# Patient Record
Sex: MALE | Race: WHITE | NOT HISPANIC OR LATINO | URBAN - METROPOLITAN AREA
[De-identification: names, ages, dates, MRNs, and addresses within clinical notes are randomized per-mention and may not be internally consistent; named-entity substitution may affect disease eponyms.]

---

## 2018-10-01 ENCOUNTER — TELEPHONE (OUTPATIENT)
Dept: UROLOGY | Facility: CLINIC | Age: 53
End: 2018-10-01

## 2018-10-01 NOTE — TELEPHONE ENCOUNTER
Yolanda Doe patient, last seen in follow up in 2016,  Spoke to patient , he called for refill of Trimix,  Advised patient due to length of time since his las follow up he will need an appointment  Follow up scheduled for 11/8/18 at 2:30 pm Robert with [facility],  Provided patient with office address

## 2018-11-05 ENCOUNTER — OFFICE VISIT (OUTPATIENT)
Dept: FAMILY MEDICINE CLINIC | Facility: CLINIC | Age: 53
End: 2018-11-05
Payer: COMMERCIAL

## 2018-11-05 VITALS
HEIGHT: 74 IN | WEIGHT: 260 LBS | SYSTOLIC BLOOD PRESSURE: 158 MMHG | BODY MASS INDEX: 33.37 KG/M2 | HEART RATE: 60 BPM | RESPIRATION RATE: 18 BRPM | DIASTOLIC BLOOD PRESSURE: 100 MMHG

## 2018-11-05 DIAGNOSIS — M25.561 CHRONIC PAIN OF BOTH KNEES: ICD-10-CM

## 2018-11-05 DIAGNOSIS — Z86.39 HISTORY OF HYPERGLYCEMIA: ICD-10-CM

## 2018-11-05 DIAGNOSIS — S03.40XA SPRAIN OF TEMPOROMANDIBULAR JOINT, INITIAL ENCOUNTER: ICD-10-CM

## 2018-11-05 DIAGNOSIS — R03.0 ELEVATED BLOOD PRESSURE READING WITHOUT DIAGNOSIS OF HYPERTENSION: Primary | ICD-10-CM

## 2018-11-05 DIAGNOSIS — G89.29 CHRONIC PAIN OF BOTH KNEES: ICD-10-CM

## 2018-11-05 DIAGNOSIS — Z13.220 LIPID SCREENING: ICD-10-CM

## 2018-11-05 DIAGNOSIS — I10 ESSENTIAL HYPERTENSION: ICD-10-CM

## 2018-11-05 DIAGNOSIS — K43.9 VENTRAL HERNIA WITHOUT OBSTRUCTION OR GANGRENE: ICD-10-CM

## 2018-11-05 DIAGNOSIS — G51.4 FACIAL TWITCHING: ICD-10-CM

## 2018-11-05 DIAGNOSIS — E66.09 CLASS 1 OBESITY DUE TO EXCESS CALORIES WITHOUT SERIOUS COMORBIDITY WITH BODY MASS INDEX (BMI) OF 33.0 TO 33.9 IN ADULT: ICD-10-CM

## 2018-11-05 DIAGNOSIS — Z23 NEED FOR TDAP VACCINATION: ICD-10-CM

## 2018-11-05 DIAGNOSIS — M25.562 CHRONIC PAIN OF BOTH KNEES: ICD-10-CM

## 2018-11-05 DIAGNOSIS — Z23 NEED FOR INFLUENZA VACCINATION: ICD-10-CM

## 2018-11-05 DIAGNOSIS — Z12.5 PROSTATE CANCER SCREENING: ICD-10-CM

## 2018-11-05 DIAGNOSIS — Z76.89 ENCOUNTER TO ESTABLISH CARE: ICD-10-CM

## 2018-11-05 PROBLEM — E66.811 CLASS 1 OBESITY DUE TO EXCESS CALORIES WITHOUT SERIOUS COMORBIDITY WITH BODY MASS INDEX (BMI) OF 33.0 TO 33.9 IN ADULT: Status: ACTIVE | Noted: 2018-11-05

## 2018-11-05 PROCEDURE — 90471 IMMUNIZATION ADMIN: CPT

## 2018-11-05 PROCEDURE — 99204 OFFICE O/P NEW MOD 45 MIN: CPT | Performed by: NURSE PRACTITIONER

## 2018-11-05 PROCEDURE — 90715 TDAP VACCINE 7 YRS/> IM: CPT

## 2018-11-05 PROCEDURE — 3008F BODY MASS INDEX DOCD: CPT | Performed by: NURSE PRACTITIONER

## 2018-11-05 PROCEDURE — 90472 IMMUNIZATION ADMIN EACH ADD: CPT

## 2018-11-05 PROCEDURE — 90682 RIV4 VACC RECOMBINANT DNA IM: CPT

## 2018-11-05 PROCEDURE — 3725F SCREEN DEPRESSION PERFORMED: CPT

## 2018-11-05 RX ORDER — AMLODIPINE BESYLATE 5 MG/1
5 TABLET ORAL DAILY
Qty: 30 TABLET | Refills: 2 | Status: SHIPPED | OUTPATIENT
Start: 2018-11-05 | End: 2019-02-28

## 2018-11-05 RX ORDER — IBUPROFEN 600 MG/1
600 TABLET ORAL ONCE
Status: DISCONTINUED | OUTPATIENT
Start: 2018-11-05 | End: 2018-11-05

## 2018-11-05 NOTE — PATIENT INSTRUCTIONS
TMJ pain- start Ibuprofen   Apply ice as needed  Bilateral knee pain- start Voltaren gel  If this is too expensive when you pick it up, dont get it and let me know  Pt declines seeing orho at this time  Hypertension- limit salt in diet  Start Amlodipine for blood pressure control  Check labs  Ventral hernia- asymptomatic  Monitor  H/o hyperglycemia- obtain labs and A1C  Prostate cancer screening- check PSA lab  Colon cancer screening- refer to GI for colonoscopy  Follow up in 3 months
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
## 2018-11-08 ENCOUNTER — OFFICE VISIT (OUTPATIENT)
Dept: UROLOGY | Facility: CLINIC | Age: 53
End: 2018-11-08
Payer: COMMERCIAL

## 2018-11-08 VITALS
BODY MASS INDEX: 33.11 KG/M2 | HEIGHT: 74 IN | SYSTOLIC BLOOD PRESSURE: 140 MMHG | WEIGHT: 258 LBS | DIASTOLIC BLOOD PRESSURE: 82 MMHG

## 2018-11-08 DIAGNOSIS — Z12.5 PROSTATE CANCER SCREENING: Primary | ICD-10-CM

## 2018-11-08 PROCEDURE — 99213 OFFICE O/P EST LOW 20 MIN: CPT | Performed by: PHYSICIAN ASSISTANT

## 2018-11-08 NOTE — PROGRESS NOTES
1  Prostate cancer screening  PSA, total and free    PSA, total and free       Assessment and plan:       1  Erectile dysfunction - managed by Dr Dinorah Leyva  - Patient would like to refill his intracavernosal injections  - Reviewed proper storage and use of the medication  Side effect profile reviewed  - He will follow up in 1 year with PSA prior to visit  - all questions answered  Allison Chacon PA-C      Chief Complaint     F/u erectile dysfunction    History of Present Illness     Rebekah Almonte is a 45 y o    male patient of Dr Dinorah Leyva with a history of erectile dysfunction presenting for follow-up  Patient last seen in May 2016  He has a history of erectile dysfunction  He tried multiple oral PD 5 inhibitors without any benefit  He has been managed on intracavernosal injections  He shin over the past year due to relationship issues  He wishes to reinitiate this medication  He denied any previous complications from the injections  Patient is overall happy with his urination  Feels like he has a good stream, feels empty after urination, has nocturia 1 time nightly  Denies any dysuria, gross hematuria, suprapubic pressure, bone pain, weight loss  Review of Systems     Review of Systems   Constitutional: Negative for activity change, appetite change, chills, diaphoresis, fatigue, fever and unexpected weight change  Respiratory: Negative for chest tightness and shortness of breath  Cardiovascular: Negative for chest pain, palpitations and leg swelling  Gastrointestinal: Negative for abdominal distention, abdominal pain, constipation, diarrhea, nausea and vomiting  Genitourinary: Negative for decreased urine volume, difficulty urinating, dysuria, enuresis, flank pain, frequency, genital sores, hematuria and urgency  Musculoskeletal: Negative for back pain, gait problem and myalgias  Skin: Negative for color change, pallor, rash and wound  Psychiatric/Behavioral: Negative for behavioral problems  The patient is not nervous/anxious  AUA SYMPTOM SCORE      Most Recent Value   AUA SYMPTOM SCORE   How often have you had a sensation of not emptying your bladder completely after you finished urinating? 0   How often have you had to urinate again less than two hours after you finished urinating? 0   How often have you found you stopped and started again several times when you urinate?  0   How often have you found it difficult to postpone urination? 2   How often have you had a weak urinary stream?  0   How often have you had to push or strain to begin urination? 0   How many times did you most typically get up to urinate from the time you went to bed at night until the time you got up in the morning? 2   Quality of Life: If you were to spend the rest of your life with your urinary condition just the way it is now, how would you feel about that?  1   AUA SYMPTOM SCORE  4          Allergies     No Known Allergies    Physical Exam     Physical Exam   Constitutional: He is oriented to person, place, and time  He appears well-developed and well-nourished  No distress  HENT:   Head: Normocephalic and atraumatic  Eyes: Conjunctivae are normal    Neck: Normal range of motion  No tracheal deviation present  Pulmonary/Chest: Effort normal    Musculoskeletal: Normal range of motion  He exhibits no edema or deformity  Neurological: He is alert and oriented to person, place, and time  Skin: Skin is warm and dry  No rash noted  He is not diaphoretic  No erythema  Psychiatric: He has a normal mood and affect   His behavior is normal        Vital Signs     Vitals:    11/08/18 1431   BP: 140/82   BP Location: Left arm   Patient Position: Sitting   Cuff Size: Standard   Weight: 117 kg (258 lb)   Height: 6' 2" (1 88 m)       Current Medications       Current Outpatient Prescriptions:     amLODIPine (NORVASC) 5 mg tablet, Take 1 tablet (5 mg total) by mouth daily for 30 days, Disp: 30 tablet, Rfl: 2    diclofenac sodium (VOLTAREN) 1 %, Apply 2 g topically 4 (four) times a day for 30 days, Disp: 240 g, Rfl: 1      Active Problems     Patient Active Problem List   Diagnosis    Prostate cancer screening    Elevated blood pressure reading without diagnosis of hypertension    Facial twitching    Need for influenza vaccination    Need for Tdap vaccination    Class 1 obesity due to excess calories without serious comorbidity with body mass index (BMI) of 33 0 to 33 9 in adult    History of hyperglycemia    Ventral hernia without obstruction or gangrene    Chronic pain of both knees    Sprain of temporomandibular joint or ligament    Essential hypertension       Past Medical History     Past Medical History:   Diagnosis Date    Abnormal glucose 05/14/2008    Chest pain 04/23/2008    Erectile dysfunction     last assessed 10/02/13       Surgical History     Past Surgical History:   Procedure Laterality Date    NO PAST SURGERIES           Family History     Family History   Problem Relation Age of Onset    No Known Problems Mother          Social History     Social History       Radiology

## 2018-11-09 ENCOUNTER — TELEPHONE (OUTPATIENT)
Dept: UROLOGY | Facility: CLINIC | Age: 53
End: 2018-11-09

## 2018-11-09 NOTE — TELEPHONE ENCOUNTER
Attempted to call patient to let him know that refill was sent in but phone number in chart is not an active number  Called [facility] to let them know that we are faxing over script  [facility] has the same number for patient

## 2019-02-16 LAB
ALBUMIN SERPL-MCNC: 4.3 G/DL (ref 3.5–5.5)
ALBUMIN/GLOB SERPL: 1.5 {RATIO} (ref 1.2–2.2)
ALP SERPL-CCNC: 117 IU/L (ref 39–117)
ALT SERPL-CCNC: 34 IU/L (ref 0–44)
AST SERPL-CCNC: 21 IU/L (ref 0–40)
BILIRUB SERPL-MCNC: 0.4 MG/DL (ref 0–1.2)
BUN SERPL-MCNC: 13 MG/DL (ref 6–24)
BUN/CREAT SERPL: 17 (ref 9–20)
CALCIUM SERPL-MCNC: 9.7 MG/DL (ref 8.7–10.2)
CHLORIDE SERPL-SCNC: 100 MMOL/L (ref 96–106)
CHOLEST SERPL-MCNC: 172 MG/DL (ref 100–199)
CO2 SERPL-SCNC: 26 MMOL/L (ref 20–29)
CREAT SERPL-MCNC: 0.77 MG/DL (ref 0.76–1.27)
EST. AVERAGE GLUCOSE BLD GHB EST-MCNC: 157 MG/DL
GLOBULIN SER-MCNC: 2.9 G/DL (ref 1.5–4.5)
GLUCOSE SERPL-MCNC: 163 MG/DL (ref 65–99)
HBA1C MFR BLD: 7.1 % (ref 4.8–5.6)
HDLC SERPL-MCNC: 40 MG/DL
LDLC SERPL CALC-MCNC: 110 MG/DL (ref 0–99)
POTASSIUM SERPL-SCNC: 4.4 MMOL/L (ref 3.5–5.2)
PROT SERPL-MCNC: 7.2 G/DL (ref 6–8.5)
PSA SERPL-MCNC: 1 NG/ML (ref 0–4)
SL AMB EGFR AFRICAN AMERICAN: 118 ML/MIN/1.73
SL AMB EGFR NON AFRICAN AMERICAN: 102 ML/MIN/1.73
SL AMB VLDL CHOLESTEROL CALC: 22 MG/DL (ref 5–40)
SODIUM SERPL-SCNC: 141 MMOL/L (ref 134–144)
TRIGL SERPL-MCNC: 112 MG/DL (ref 0–149)

## 2019-02-28 ENCOUNTER — OFFICE VISIT (OUTPATIENT)
Dept: FAMILY MEDICINE CLINIC | Facility: CLINIC | Age: 54
End: 2019-02-28
Payer: COMMERCIAL

## 2019-02-28 VITALS
DIASTOLIC BLOOD PRESSURE: 90 MMHG | RESPIRATION RATE: 18 BRPM | SYSTOLIC BLOOD PRESSURE: 150 MMHG | WEIGHT: 259 LBS | HEART RATE: 71 BPM | HEIGHT: 74 IN | OXYGEN SATURATION: 97 % | BODY MASS INDEX: 33.24 KG/M2

## 2019-02-28 DIAGNOSIS — I10 ESSENTIAL HYPERTENSION: Primary | ICD-10-CM

## 2019-02-28 DIAGNOSIS — E11.9 TYPE 2 DIABETES MELLITUS WITHOUT COMPLICATION, WITHOUT LONG-TERM CURRENT USE OF INSULIN (HCC): ICD-10-CM

## 2019-02-28 DIAGNOSIS — B35.1 TOENAIL FUNGUS: ICD-10-CM

## 2019-02-28 DIAGNOSIS — E78.2 HYPERLIPIDEMIA, MIXED: ICD-10-CM

## 2019-02-28 DIAGNOSIS — E66.09 CLASS 1 OBESITY DUE TO EXCESS CALORIES WITHOUT SERIOUS COMORBIDITY WITH BODY MASS INDEX (BMI) OF 33.0 TO 33.9 IN ADULT: ICD-10-CM

## 2019-02-28 PROCEDURE — 3008F BODY MASS INDEX DOCD: CPT | Performed by: NURSE PRACTITIONER

## 2019-02-28 PROCEDURE — 1036F TOBACCO NON-USER: CPT | Performed by: NURSE PRACTITIONER

## 2019-02-28 PROCEDURE — 99214 OFFICE O/P EST MOD 30 MIN: CPT | Performed by: NURSE PRACTITIONER

## 2019-02-28 RX ORDER — ATORVASTATIN CALCIUM 10 MG/1
10 TABLET, FILM COATED ORAL DAILY
Qty: 90 TABLET | Refills: 3 | Status: SHIPPED | OUTPATIENT
Start: 2019-02-28 | End: 2019-07-16 | Stop reason: SDUPTHER

## 2019-02-28 RX ORDER — AMLODIPINE BESYLATE 10 MG/1
10 TABLET ORAL DAILY
Qty: 90 TABLET | Refills: 3 | Status: SHIPPED | OUTPATIENT
Start: 2019-02-28 | End: 2019-07-16 | Stop reason: SDUPTHER

## 2019-02-28 NOTE — PATIENT INSTRUCTIONS
Hypertension- limit salt in diet  Increase amlodipine to 10 mg daily  Advised on need to lose weight  Hyperlipidemia- strive for 5 servings of fruits and veggies daily   Start atorvastin  Diabetes- start metformin twice a day  Limit carbohydrates and sugars  Start checking glucose twice a day  In morning on wakening and then in the evening  Pt advised to make appoinmtent with eye doctor  Referral to [facility]  Obesity- advised that he needs to lose weight through diet and exercise  Toenail fungus- keep nails trimmed  Keep feet as dry as possible  He will be seeing Podiatry for diabetic foot care  May take baby aspirin once a day  Follow up in 3 months  Obtain labs one week prior to visit    Basic Carbohydrate Counting   AMBULATORY CARE:   Carbohydrate counting  is a way to plan your meals by counting the amount of carbohydrate in foods  Carbohydrates are the sugars, starches, and fiber found in fruit, grains, vegetables, and milk products  Carbohydrates increase your blood sugar levels  Carbohydrate counting can help you eat the right amount of carbohydrate to keep your blood sugar levels under control  What you need to know about planning meals using carbohydrate counting:  · A dietitian or healthcare provider will help you develop a healthy meal plan that works best for you  You will be taught how much carbohydrate to eat or drink for each meal and snack  Your meal plan will be based on your age, weight, usual food intake, and physical activity level  If you have diabetes, it will also include your blood sugar levels and diabetes medicine  Once you know how much carbohydrate you should eat, you can decide what type of food you want to eat  · You will need to know what foods contain carbohydrate and how much they contain  Keep track of the amount of carbohydrate in meals and snacks in order to follow your meal plan  Do not avoid carbohydrates or skip meals   Your blood sugar may fall too low if you do not eat enough carbohydrate or you skip meals  Foods that contain carbohydrate:   · Breads:  Each serving of food listed below contains about 15 g of carbohydrate   ¨ 1 slice of bread (1 ounce) or 1 flour or corn tortilla (6 inch)    ¨ ½ of a hamburger bun or ¼ of a large bagel (about 1 ounce)    ¨ 1 pancake (about 4 inches across and ¼ inch thick)    · Cereals and grains:  Serving sizes of ready-to-eat cereals vary  Look at the serving size and the total carbohydrate amount listed on the food label  Each serving of food listed below contains about 15 g of carbohydrate   ¨ ¾ cup of dry, unsweetened, ready-to-eat cereal or ¼ cup of low-fat granola     ¨ ½ cup of oatmeal or other cooked cereal     ¨ ? cup of cooked rice or pasta    · Starchy vegetables and beans:  Each serving of food listed below contains about 15 g of carbohydrate   ¨ ½ cup of corn, green peas, sweet potatoes, or mashed potatoes    ¨ ¼ of a large baked potato    ¨ ½ cup of beans, lentils, and peas (garbanzo, willams, kidney, white, split, black-eyed)    · Crackers and snacks:  Each serving of food listed below contains about 15 g of carbohydrate   ¨ 3 aislinn cracker squares or 8 animal crackers     ¨ 6 saltine-type crackers    ¨ 3 cups of popcorn or ¾ ounce of pretzels, potato chips, or tortilla chips    · Fruit:  Each serving of food listed below contains about 15 g of carbohydrate   ¨ 1 small (4 ounce) piece of fresh fruit or ¾ to 1 cup of fresh fruit    ¨ ½ cup of canned or frozen fruit, packed in natural juice    ¨ ½ cup (4 ounces) of unsweetened fruit juice    ¨ 2 tablespoons of dried fruit    · Desserts or sugary foods:  Each serving of food listed below contains about 15 g of carbohydrate       ¨ 2-inch square unfrosted cake or brownie     ¨ 2 small cookies    ¨ ½ cup of ice cream, frozen yogurt, or nondairy frozen yogurt    ¨ ¼ cup of sherbet or sorbet    ¨ 1 tablespoon of regular syrup, jam, or jelly    ¨ 2 tablespoons of light syrup    · Milk and yogurt:  Foods from the milk group contain about 12 g of carbohydrate per serving  ¨ 1 cup of fat-free or low-fat milk    ¨ 1 cup of soy milk    ¨ ? cup of fat-free, yogurt sweetened with artificial sweetener    · Non-starchy vegetables:  Each serving contains about 5 g of carbohydrate   Three servings of non-starch vegetables count as 1 carbohydrate serving  ¨ ½ cup of cooked vegetables or 1 cup of raw vegetables  This includes beets, broccoli, cabbage, cauliflower, cucumber, mushrooms, tomatoes, and zucchini    ¨ ½ cup of vegetable juice  How to use carbohydrate counting to plan meals:   · Count carbohydrate amounts using serving sizes:      ¨ Pasta dinner example: You plan to have pasta, tossed salad, and an 8-ounce glass of milk  Your healthcare provider tells you that you may have 4 carbohydrate servings for dinner  One carbohydrate serving of pasta is ? cup  One cup of pasta will equal 3 carbohydrate servings  An 8-ounce glass of milk will count as 1 carbohydrate serving  These amounts of food would equal 4 carbohydrate servings  One cup of tossed salad does not count toward your carbohydrate servings  · Count carbohydrate amounts using food labels:  Find the total amount of carbohydrate in a packaged food by reading the food label  Food labels tell you the serving size of the food and the total carbohydrate amount in each serving  Find the serving size on the food label and then decide how many servings you will eat  Multiply the number of servings you plan to eat by the carbohydrate amount per serving  ¨ Granola bar snack example: Your meal plan allows you to have 2 carbohydrate servings (30 grams) of carbohydrate for a snack  You plan to eat 1 package of granola bars, which contains 2 bars  According to the food label, the serving size of food in this package is 1 bar  Each serving (1 bar) contains 25 grams of carbohydrate   The total amount of carbohydrate in this package of granola bars would be 50 g  Based on your meal plan, you should eat only 1 bar  Follow up with your healthcare provider as directed:  Write down your questions so you remember to ask them during your visits  © 2017 [employer] Information is for End User's use only and may not be sold, redistributed or otherwise used for commercial purposes  All illustrations and images included in CareNotes® are the copyrighted property of [employer]  or [employer]  The above information is an  only  It is not intended as medical advice for individual conditions or treatments  Talk to your doctor, nurse or pharmacist before following any medical regimen to see if it is safe and effective for you  DASH Eating Plan   AMBULATORY CARE:   The DASH (Dietary Approaches to Stop Hypertension) Eating Plan  is designed to help prevent or lower high blood pressure  It can also help to lower LDL (bad) cholesterol and decrease your risk for heart disease  The plan is low in sodium, sugar, unhealthy fats, and total fat  It is high in potassium, calcium, magnesium, and fiber  These nutrients are added when you eat more fruits, vegetables, and whole grains  Your sodium limit each day: Your dietitian will tell you how much sodium is safe for you to have each day  People with high blood pressure should have no more than 1,500 to 2,300 mg of sodium in a day  A teaspoon (tsp) of salt has 2,300 mg of sodium  This may seem like a difficult goal, but small changes to the foods you eat can make a big difference  Your healthcare provider or dietitian can help you create a meal plan that follows your sodium limit  How to limit sodium:   · Read food labels  Food labels can help you choose foods that are low in sodium  The amount of sodium is listed in milligrams (mg)   The % Daily Value (DV) column tells you how much of your daily needs are met by 1 serving of the food for each nutrient listed  Choose foods that have less than 5% of the DV of sodium  These foods are considered low in sodium  Foods that have 20% or more of the DV of sodium are considered high in sodium  Avoid foods that have more than 300 mg of sodium in each serving  Choose foods that say low-sodium, reduced-sodium, or no salt added on the food label  · Avoid salt  Do not salt food at the table, and add very little salt to foods during cooking  Use herbs and spices, such as onions, garlic, and salt-free seasonings to add flavor to foods  Try lemon or lime juice or vinegar to give foods a tart flavor  Use hot peppers or a small amount of hot pepper sauce to add a spicy flavor to foods  · Ask about salt substitutes  Ask your healthcare provider if you may use salt substitutes  Some salt substitutes have ingredients that can be harmful if you have certain health conditions  · Choose foods carefully at restaurants  Meals from restaurants, especially fast food restaurants, are often high in sodium  Some restaurants have nutrition information that tells you the amount of sodium in their foods  Ask to have your food prepared with less, or no salt  What you need to know about fats:   · Include healthy fats  Examples are unsaturated fats and omega-3 fatty acids  Unsaturated fats are found in soybean, canola, olive, or sunflower oil, and liquid and soft tub margarines  Omega-3 fatty acids are found in fatty fish, such as salmon, tuna, mackerel, and sardines  It is also found in flaxseed oil and ground flaxseed  · Avoid unhealthy fats  Do not eat unhealthy fats, such as saturated fats and trans fats  Saturated fats are found in foods that contain fat from animals  Examples are fatty meats, whole milk, butter, cream, and other dairy foods  It is also found in shortening, stick margarine, palm oil, and coconut oil   Trans fats are found in fried foods, crackers, chips, and baked goods made with margarine or shortening  Foods to include: With the DASH eating plan, you need to eat a certain number of servings from each food group  This will help you get enough of certain nutrients and limit others  The amount of servings you should eat depends on how many calories you need  Your dietitian can tell you how many calories you need  The number of servings listed next to the food groups below are for people who need about 2,000 calories each day    · Grains:  6 to 8 servings (3 of these servings should be whole-grain foods)    ¨ 1 slice of whole-grain bread     ¨ 1 ounce of dry cereal    ¨ ½ cup of cooked cereal, pasta, or brown rice    · Vegetables and fruits:  4 to 5 servings of fruits and 4 to 5 servings of vegetables    ¨ 1 medium fruit    ¨ ½ cup of frozen, canned (no added salt), or chopped fresh vegetables     ¨ ½ cup of fresh, frozen, dried, or canned fruit (canned in light syrup or fruit juice)    ¨ ½ cup of vegetable or fruit juice    · Dairy:  2 to 3 servings    ¨ 1 cup of nonfat (skim) or 1% milk    ¨ 1½ ounces of fat-free or low-fat cheese    ¨ 6 ounces of nonfat or low-fat yogurt    · Lean meat, poultry, and fish:  6 ounces or less    [employer] (chicken, turkey) with no skin    ¨ Fish (especially fatty fish, such as salmon, fresh tuna, or mackerel)    ¨ Lean beef and pork (loin, round, extra lean hamburger)    ¨ Egg whites and egg substitutes    · Nuts, seeds, and legumes:  4 to 5 servings each week    ¨ ½ cup of cooked beans and peas    ¨ 1½ ounces of unsalted nuts    ¨ 2 tablespoons of peanut butter or seeds    · Sweets and added sugars:  5 or less each week    ¨ 1 tablespoon of sugar, jelly, or jam    ¨ ½ cup of sorbet or gelatin    ¨ 1 cup of lemonade    · Fats:  2 to 3 servings each week    ¨ 1 teaspoon of soft margarine or vegetable oil    ¨ 1 tablespoon of mayonnaise    ¨ 2 tablespoons of salad dressing  Foods to avoid:   · Grains:      [employer], such as doughnuts, pastries, cookies, and biscuits (high in fat and sugar)    ¨ Mixes for cornbread and biscuits, packaged foods, such as bread stuffing, rice and pasta mixes, macaroni and cheese, and instant cereals (high in sodium)    · Fruits and vegetables:      ¨ Regular, canned vegetables (high in sodium)    ¨ Sauerkraut, pickled vegetables, and other foods prepared in brine (high in sodium)    ¨ Fried vegetables or vegetables in butter or high-fat sauces    ¨ Fruit in cream or butter sauce (high in fat)    · Dairy:      ¨ Whole milk, 2% milk, and cream (high in fat)    ¨ Regular cheese and processed cheese (high in fat and sodium)    · Meats and protein foods:      ¨ Smoked or cured meat, such as corned beef, lopez, ham, hot dogs, and sausage (high in fat and sodium)    ¨ Canned beans and canned meats or spreads, such as potted meats, sardines, anchovies, and imitation seafood (high in sodium)    ¨ Deli or lunch meats, such as bologna, ham, turkey, and roast beef (high in sodium)    ¨ High-fat meat (T-bone steak, regular hamburger, and ribs)    ¨ Whole eggs and egg yolks (high in fat)    · Other:      ¨ Seasonings made with salt, such as garlic salt, celery salt, onion salt, seasoned salt, meat tenderizers, and monosodium glutamate (MSG)    ¨ Miso soup and canned or dried soup mixes (high in sodium)    ¨ Regular soy sauce, barbecue sauce, teriyaki sauce, steak sauce, Worcestershire sauce, and most flavored vinegars (high in sodium)    ¨ Regular condiments, such as mustard, ketchup, and salad dressings (high in sodium)    ¨ Gravy and sauces, such as Yuan or cheese sauces (high in sodium and fat)    ¨ Drinks high in sugar, such as soda or fruit drinks    [employer] foods, such as salted chips, popcorn, pretzels, pork rinds, salted crackers, and salted nuts    ¨ Frozen foods, such as dinners, entrees, vegetables with sauces, and breaded meats (high in sodium)  Other guidelines to follow:   · Maintain a healthy weight    Your risk for heart disease is higher if you are overweight  Your healthcare provider may suggest that you lose weight if you are overweight  You can lose weight by eating fewer calories and foods that have added sugars and fat  The DASH meal plan can help you do this  Decrease calories by eating smaller portions at each meal and fewer snacks  Ask your healthcare provider for more information about how to lose weight  · Exercise regularly  Regular exercise can help you reach or maintain a healthy weight  Regular exercise can also help decrease your blood pressure and improve your cholesterol levels  Get 30 minutes or more of moderate exercise each day of the week  To lose weight, get at least 60 minutes of exercise  Talk to your healthcare provider about the best exercise program for you  · Limit alcohol  Women should limit alcohol to 1 drink a day  Men should limit alcohol to 2 drinks a day  A drink of alcohol is 12 ounces of beer, 5 ounces of wine, or 1½ ounces of liquor  © 2017 [address] [employer] Information is for End User's use only and may not be sold, redistributed or otherwise used for commercial purposes  All illustrations and images included in CareNotes® are the copyrighted property of A [employer] M [employer] Inc  or [employer]  The above information is an  only  It is not intended as medical advice for individual conditions or treatments  Talk to your doctor, nurse or pharmacist before following any medical regimen to see if it is safe and effective for you

## 2019-02-28 NOTE — PROGRESS NOTES
Assessment/Plan:       Diagnoses and all orders for this visit:    Essential hypertension  -     amLODIPine (NORVASC) 10 mg tablet; Take 1 tablet (10 mg total) by mouth daily for 90 days  -     Glucometer test strips  -     Comprehensive metabolic panel; Future  -     Hemoglobin A1C; Future  -     Lipid panel; Future  -     Comprehensive metabolic panel  -     Hemoglobin A1C  -     Lipid panel  -     Comprehensive metabolic panel; Future  -     HEMOGLOBIN A1C W/ EAG ESTIMATION; Future  -     Lipid Panel with Direct LDL reflex; Future    Type 2 diabetes mellitus without complication, without long-term current use of insulin (HCC)  -     atorvastatin (LIPITOR) 10 mg tablet; Take 1 tablet (10 mg total) by mouth daily for 90 days  -     metFORMIN (GLUCOPHAGE) 500 mg tablet; Take 1 tablet (500 mg total) by mouth 2 (two) times a day with meals for 90 days  -     One Touch Verio IQ  -     Glucometer test strips  -     Lancets  -     Comprehensive metabolic panel; Future  -     Hemoglobin A1C; Future  -     Lipid panel; Future  -     Microalbumin / creatinine urine ratio  -     Comprehensive metabolic panel  -     Hemoglobin A1C  -     Lipid panel  -     Ambulatory referral to Podiatry; Future  -     Comprehensive metabolic panel; Future  -     HEMOGLOBIN A1C W/ EAG ESTIMATION; Future  -     Lipid Panel with Direct LDL reflex; Future    Hyperlipidemia, mixed  -     atorvastatin (LIPITOR) 10 mg tablet; Take 1 tablet (10 mg total) by mouth daily for 90 days  -     Glucometer test strips  -     Comprehensive metabolic panel; Future  -     Hemoglobin A1C; Future  -     Lipid panel; Future  -     Comprehensive metabolic panel  -     Hemoglobin A1C  -     Lipid panel  -     Comprehensive metabolic panel; Future  -     HEMOGLOBIN A1C W/ EAG ESTIMATION; Future  -     Lipid Panel with Direct LDL reflex;  Future    Class 1 obesity due to excess calories without serious comorbidity with body mass index (BMI) of 33 0 to 33 9 in adult  - atorvastatin (LIPITOR) 10 mg tablet; Take 1 tablet (10 mg total) by mouth daily for 90 days  -     Glucometer test strips  -     Comprehensive metabolic panel; Future  -     Hemoglobin A1C; Future  -     Lipid panel; Future  -     Comprehensive metabolic panel  -     Hemoglobin A1C  -     Lipid panel  -     Comprehensive metabolic panel; Future  -     HEMOGLOBIN A1C W/ EAG ESTIMATION; Future  -     Lipid Panel with Direct LDL reflex; Future    Toenail fungus        No problem-specific Assessment & Plan notes found for this encounter  Subjective:      Patient ID: Estevan Sanford is a 52 y o  male  Patient is here for follow upa nd review of labs  Hypertension- not at goal  He has been taking amlodipine for 3 months  DM- new dx on labs from today  No family h/o diabetes  He does have poor diet  He does report a "cold " feeling of his fingers, intermittently  He does not see eye doctor  Pt tells me has has nail fungus on his toes  He was evaluated for this at his prior practitioner, but no treatment ever started before he came here  Labs: PSA- 1 0   Glu 163  CMP- wnl  A1C- 7 1   Chol 172  tg- 112  hdl- 40  ldl- 110      The following portions of the patient's history were reviewed and updated as appropriate:   He has a past medical history of Abnormal glucose (05/14/2008), Chest pain (04/23/2008), and Erectile dysfunction  ,  does not have any pertinent problems on file  ,   has a past surgical history that includes No past surgeries  ,  family history includes No Known Problems in his mother  ,   reports that he has never smoked  He has never used smokeless tobacco  He reports that he does not drink alcohol or use drugs  ,  has No Known Allergies     Current Outpatient Medications   Medication Sig Dispense Refill    amLODIPine (NORVASC) 10 mg tablet Take 1 tablet (10 mg total) by mouth daily for 90 days 90 tablet 3    atorvastatin (LIPITOR) 10 mg tablet Take 1 tablet (10 mg total) by mouth daily for 90 days 90 tablet 3    diclofenac sodium (VOLTAREN) 1 % Apply 2 g topically 4 (four) times a day for 30 days 240 g 1    metFORMIN (GLUCOPHAGE) 500 mg tablet Take 1 tablet (500 mg total) by mouth 2 (two) times a day with meals for 90 days 180 tablet 3     No current facility-administered medications for this visit  Review of Systems   Constitutional: Negative for fatigue and fever  HENT: Negative for congestion, rhinorrhea, sore throat, trouble swallowing and voice change  Eyes: Negative for visual disturbance  Respiratory: Negative for cough and shortness of breath  Cardiovascular: Negative for chest pain, palpitations and leg swelling  Gastrointestinal: Negative for abdominal distention and abdominal pain  Endocrine: Negative for cold intolerance, polydipsia and polyuria  Genitourinary: Negative for difficulty urinating  Musculoskeletal: Negative for back pain and joint swelling  Skin: Negative for color change and rash  Toenail fungus   Allergic/Immunologic: Negative for immunocompromised state  Neurological: Negative for dizziness, weakness, numbness and headaches         "cold" feeling to fingers on both hands   Hematological: Negative for adenopathy  Psychiatric/Behavioral: Negative for behavioral problems and sleep disturbance  All other systems reviewed and are negative  Objective:  Vitals:    02/28/19 1828   BP: 150/90   BP Location: Left arm   Patient Position: Sitting   Pulse: 71   Resp: 18   SpO2: 97%   Weight: 117 kg (259 lb)   Height: 6' 2" (1 88 m)     Body mass index is 33 25 kg/m²  Physical Exam   Constitutional: He is oriented to person, place, and time  He appears well-developed and well-nourished  No distress  HENT:   Head: Normocephalic and atraumatic  Right Ear: External ear normal    Left Ear: External ear normal    Nose: Nose normal    Mouth/Throat: Oropharynx is clear and moist  No oropharyngeal exudate     Nasal congestion withj swollen turbinates, b/l TM fullness   Eyes: Pupils are equal, round, and reactive to light  Conjunctivae and EOM are normal  Right eye exhibits no discharge  Left eye exhibits no discharge  No scleral icterus  Neck: Normal range of motion  Neck supple  No JVD present  No thyromegaly present  Cardiovascular: Normal rate, regular rhythm, normal heart sounds and intact distal pulses  Exam reveals no gallop and no friction rub  No murmur heard  Pulmonary/Chest: Effort normal and breath sounds normal  No respiratory distress  Abdominal: Soft  There is no tenderness  Musculoskeletal: Normal range of motion  He exhibits no edema  Lymphadenopathy:     He has no cervical adenopathy  Neurological: He is alert and oriented to person, place, and time  Skin: Skin is warm and dry  Capillary refill takes less than 2 seconds  He is not diaphoretic  Psychiatric: He has a normal mood and affect  His behavior is normal  Judgment and thought content normal    Nursing note and vitals reviewed

## 2019-05-21 ENCOUNTER — OFFICE VISIT (OUTPATIENT)
Dept: FAMILY MEDICINE CLINIC | Facility: CLINIC | Age: 54
End: 2019-05-21
Payer: COMMERCIAL

## 2019-05-21 VITALS
SYSTOLIC BLOOD PRESSURE: 142 MMHG | HEIGHT: 74 IN | BODY MASS INDEX: 32.26 KG/M2 | DIASTOLIC BLOOD PRESSURE: 88 MMHG | HEART RATE: 80 BPM | OXYGEN SATURATION: 97 % | WEIGHT: 251.4 LBS | TEMPERATURE: 100.4 F

## 2019-05-21 DIAGNOSIS — E78.2 HYPERLIPIDEMIA, MIXED: ICD-10-CM

## 2019-05-21 DIAGNOSIS — R31.9 URINARY TRACT INFECTION WITH HEMATURIA, SITE UNSPECIFIED: Primary | ICD-10-CM

## 2019-05-21 DIAGNOSIS — N39.0 URINARY TRACT INFECTION WITH HEMATURIA, SITE UNSPECIFIED: Primary | ICD-10-CM

## 2019-05-21 DIAGNOSIS — R35.0 FREQUENCY OF URINATION: ICD-10-CM

## 2019-05-21 DIAGNOSIS — E11.9 TYPE 2 DIABETES MELLITUS WITHOUT COMPLICATION, WITHOUT LONG-TERM CURRENT USE OF INSULIN (HCC): ICD-10-CM

## 2019-05-21 LAB
SL AMB  POCT GLUCOSE, UA: ABNORMAL
SL AMB LEUKOCYTE ESTERASE,UA: ABNORMAL
SL AMB POCT BILIRUBIN,UA: ABNORMAL
SL AMB POCT BLOOD,UA: ABNORMAL
SL AMB POCT CLARITY,UA: CLEAR
SL AMB POCT COLOR,UA: YELLOW
SL AMB POCT KETONES,UA: ABNORMAL
SL AMB POCT NITRITE,UA: POSITIVE
SL AMB POCT PH,UA: 5.5
SL AMB POCT SPECIFIC GRAVITY,UA: 1.02
SL AMB POCT URINE PROTEIN: ABNORMAL
SL AMB POCT UROBILINOGEN: 4

## 2019-05-21 PROCEDURE — 99214 OFFICE O/P EST MOD 30 MIN: CPT | Performed by: FAMILY MEDICINE

## 2019-05-21 PROCEDURE — 81003 URINALYSIS AUTO W/O SCOPE: CPT | Performed by: FAMILY MEDICINE

## 2019-05-21 RX ORDER — SULFAMETHOXAZOLE AND TRIMETHOPRIM 800; 160 MG/1; MG/1
1 TABLET ORAL EVERY 12 HOURS SCHEDULED
Qty: 20 TABLET | Refills: 0 | Status: SHIPPED | OUTPATIENT
Start: 2019-05-21 | End: 2019-05-31

## 2019-05-22 ENCOUNTER — HOSPITAL ENCOUNTER (OUTPATIENT)
Dept: ULTRASOUND IMAGING | Facility: CLINIC | Age: 54
Discharge: HOME | End: 2019-05-22
Payer: COMMERCIAL

## 2019-05-22 ENCOUNTER — TELEPHONE (OUTPATIENT)
Dept: FAMILY MEDICINE CLINIC | Facility: CLINIC | Age: 54
End: 2019-05-22

## 2019-05-22 DIAGNOSIS — R35.0 FREQUENCY OF URINATION: ICD-10-CM

## 2019-05-22 DIAGNOSIS — R31.9 URINARY TRACT INFECTION WITH HEMATURIA, SITE UNSPECIFIED: ICD-10-CM

## 2019-05-22 DIAGNOSIS — N39.0 URINARY TRACT INFECTION WITH HEMATURIA, SITE UNSPECIFIED: ICD-10-CM

## 2019-05-22 LAB — HBA1C MFR BLD HPLC: 7.1 %

## 2019-05-22 PROCEDURE — 76770 US EXAM ABDO BACK WALL COMP: CPT

## 2019-05-24 ENCOUNTER — OFFICE VISIT (OUTPATIENT)
Dept: UROLOGY | Facility: CLINIC | Age: 54
End: 2019-05-24
Payer: COMMERCIAL

## 2019-05-24 ENCOUNTER — TELEPHONE (OUTPATIENT)
Dept: UROLOGY | Facility: CLINIC | Age: 54
End: 2019-05-24

## 2019-05-24 VITALS
HEART RATE: 76 BPM | HEIGHT: 74 IN | DIASTOLIC BLOOD PRESSURE: 76 MMHG | BODY MASS INDEX: 32.73 KG/M2 | WEIGHT: 255 LBS | SYSTOLIC BLOOD PRESSURE: 132 MMHG

## 2019-05-24 DIAGNOSIS — Z12.5 PROSTATE CANCER SCREENING: Primary | ICD-10-CM

## 2019-05-24 DIAGNOSIS — N39.0 URINARY TRACT INFECTION WITH HEMATURIA, SITE UNSPECIFIED: ICD-10-CM

## 2019-05-24 DIAGNOSIS — R31.9 URINARY TRACT INFECTION WITH HEMATURIA, SITE UNSPECIFIED: ICD-10-CM

## 2019-05-24 LAB
ALBUMIN SERPL-MCNC: 4.4 G/DL (ref 3.5–5.5)
ALBUMIN/CREAT UR: 43.1 MG/G CREAT (ref 0–30)
ALBUMIN/GLOB SERPL: 1.6 {RATIO} (ref 1.2–2.2)
ALP SERPL-CCNC: 102 IU/L (ref 39–117)
ALT SERPL-CCNC: 41 IU/L (ref 0–44)
AST SERPL-CCNC: 29 IU/L (ref 0–40)
BACTERIA UR CULT: NORMAL
BASOPHILS # BLD AUTO: 0 X10E3/UL (ref 0–0.2)
BASOPHILS NFR BLD AUTO: 1 %
BILIRUB SERPL-MCNC: 0.4 MG/DL (ref 0–1.2)
BUN SERPL-MCNC: 16 MG/DL (ref 6–24)
BUN/CREAT SERPL: 17 (ref 9–20)
CALCIUM SERPL-MCNC: 9.6 MG/DL (ref 8.7–10.2)
CHLORIDE SERPL-SCNC: 102 MMOL/L (ref 96–106)
CHOLEST SERPL-MCNC: 126 MG/DL (ref 100–199)
CHOLEST/HDLC SERPL: 4.5 RATIO (ref 0–5)
CO2 SERPL-SCNC: 22 MMOL/L (ref 20–29)
CREAT SERPL-MCNC: 0.95 MG/DL (ref 0.76–1.27)
CREAT UR-MCNC: 154.4 MG/DL
EOSINOPHIL # BLD AUTO: 0.1 X10E3/UL (ref 0–0.4)
EOSINOPHIL NFR BLD AUTO: 2 %
ERYTHROCYTE [DISTWIDTH] IN BLOOD BY AUTOMATED COUNT: 14.4 % (ref 12.3–15.4)
EST. AVERAGE GLUCOSE BLD GHB EST-MCNC: 157 MG/DL
GLOBULIN SER-MCNC: 2.8 G/DL (ref 1.5–4.5)
GLUCOSE SERPL-MCNC: 113 MG/DL (ref 65–99)
HBA1C MFR BLD: 7.1 % (ref 4.8–5.6)
HCT VFR BLD AUTO: 40.9 % (ref 37.5–51)
HDLC SERPL-MCNC: 28 MG/DL
HGB BLD-MCNC: 13.8 G/DL (ref 13–17.7)
IMM GRANULOCYTES # BLD: 0 X10E3/UL (ref 0–0.1)
IMM GRANULOCYTES NFR BLD: 0 %
LDLC SERPL CALC-MCNC: 74 MG/DL (ref 0–99)
LYMPHOCYTES # BLD AUTO: 1.7 X10E3/UL (ref 0.7–3.1)
LYMPHOCYTES NFR BLD AUTO: 29 %
Lab: NO GROWTH
MCH RBC QN AUTO: 28.3 PG (ref 26.6–33)
MCHC RBC AUTO-ENTMCNC: 33.7 G/DL (ref 31.5–35.7)
MCV RBC AUTO: 84 FL (ref 79–97)
MICROALBUMIN UR-MCNC: 66.6 UG/ML
MONOCYTES # BLD AUTO: 1.4 X10E3/UL (ref 0.1–0.9)
MONOCYTES NFR BLD AUTO: 24 %
MORPHOLOGY BLD-IMP: ABNORMAL
NEUTROPHILS # BLD AUTO: 2.6 X10E3/UL (ref 1.4–7)
NEUTROPHILS NFR BLD AUTO: 44 %
PLATELET # BLD AUTO: 338 X10E3/UL (ref 150–450)
POST-VOID RESIDUAL VOLUME, ML POC: 31 ML
POTASSIUM SERPL-SCNC: 3.6 MMOL/L (ref 3.5–5.2)
PROT SERPL-MCNC: 7.2 G/DL (ref 6–8.5)
RBC # BLD AUTO: 4.87 X10E6/UL (ref 4.14–5.8)
SL AMB EGFR AFRICAN AMERICAN: 104 ML/MIN/1.73
SL AMB EGFR NON AFRICAN AMERICAN: 90 ML/MIN/1.73
SL AMB VLDL CHOLESTEROL CALC: 24 MG/DL (ref 5–40)
SODIUM SERPL-SCNC: 138 MMOL/L (ref 134–144)
TRIGL SERPL-MCNC: 119 MG/DL (ref 0–149)
WBC # BLD AUTO: 5.9 X10E3/UL (ref 3.4–10.8)

## 2019-05-24 PROCEDURE — 99213 OFFICE O/P EST LOW 20 MIN: CPT | Performed by: PHYSICIAN ASSISTANT

## 2019-05-24 PROCEDURE — 51798 US URINE CAPACITY MEASURE: CPT | Performed by: PHYSICIAN ASSISTANT

## 2019-05-24 PROCEDURE — 3045F PR MOST RECENT HEMOGLOBIN A1C LEVEL 7.0-9.0%: CPT | Performed by: NURSE PRACTITIONER

## 2019-05-24 PROCEDURE — 3045F PR MOST RECENT HEMOGLOBIN A1C LEVEL 7.0-9.0%: CPT | Performed by: FAMILY MEDICINE

## 2019-05-24 RX ORDER — TAMSULOSIN HYDROCHLORIDE 0.4 MG/1
0.4 CAPSULE ORAL
Qty: 30 CAPSULE | Refills: 6 | Status: SHIPPED | OUTPATIENT
Start: 2019-05-24 | End: 2019-06-18 | Stop reason: SDUPTHER

## 2019-06-18 ENCOUNTER — PROCEDURE VISIT (OUTPATIENT)
Dept: UROLOGY | Facility: CLINIC | Age: 54
End: 2019-06-18
Payer: COMMERCIAL

## 2019-06-18 VITALS
DIASTOLIC BLOOD PRESSURE: 80 MMHG | HEIGHT: 74 IN | BODY MASS INDEX: 32.85 KG/M2 | SYSTOLIC BLOOD PRESSURE: 130 MMHG | WEIGHT: 256 LBS | HEART RATE: 58 BPM

## 2019-06-18 DIAGNOSIS — N39.0 RECURRENT UTI: Primary | ICD-10-CM

## 2019-06-18 DIAGNOSIS — Z12.5 PROSTATE CANCER SCREENING: ICD-10-CM

## 2019-06-18 DIAGNOSIS — N39.0 URINARY TRACT INFECTION WITH HEMATURIA, SITE UNSPECIFIED: ICD-10-CM

## 2019-06-18 DIAGNOSIS — N40.1 BENIGN PROSTATIC HYPERPLASIA WITH LOWER URINARY TRACT SYMPTOMS, SYMPTOM DETAILS UNSPECIFIED: ICD-10-CM

## 2019-06-18 DIAGNOSIS — N32.89 BLADDER WALL THICKENING: ICD-10-CM

## 2019-06-18 DIAGNOSIS — R31.9 URINARY TRACT INFECTION WITH HEMATURIA, SITE UNSPECIFIED: ICD-10-CM

## 2019-06-18 LAB
SL AMB  POCT GLUCOSE, UA: ABNORMAL
SL AMB LEUKOCYTE ESTERASE,UA: ABNORMAL
SL AMB POCT BILIRUBIN,UA: ABNORMAL
SL AMB POCT BLOOD,UA: ABNORMAL
SL AMB POCT CLARITY,UA: CLEAR
SL AMB POCT COLOR,UA: ABNORMAL
SL AMB POCT KETONES,UA: ABNORMAL
SL AMB POCT NITRITE,UA: ABNORMAL
SL AMB POCT PH,UA: 6
SL AMB POCT SPECIFIC GRAVITY,UA: 1.01
SL AMB POCT URINE PROTEIN: ABNORMAL
SL AMB POCT UROBILINOGEN: ABNORMAL

## 2019-06-18 PROCEDURE — 99214 OFFICE O/P EST MOD 30 MIN: CPT | Performed by: UROLOGY

## 2019-06-18 PROCEDURE — 52000 CYSTOURETHROSCOPY: CPT | Performed by: UROLOGY

## 2019-06-18 PROCEDURE — 81002 URINALYSIS NONAUTO W/O SCOPE: CPT | Performed by: UROLOGY

## 2019-06-18 RX ORDER — TAMSULOSIN HYDROCHLORIDE 0.4 MG/1
0.4 CAPSULE ORAL
Qty: 90 CAPSULE | Refills: 3 | Status: SHIPPED | OUTPATIENT
Start: 2019-06-18 | End: 2019-07-16

## 2019-07-15 NOTE — PROGRESS NOTES
Assessment/Plan:       Diagnoses and all orders for this visit:    Type 2 diabetes mellitus without complication, without long-term current use of insulin (HCC)  -     atorvastatin (LIPITOR) 10 mg tablet; Take 1 tablet (10 mg total) by mouth daily for 90 days  -     metFORMIN (GLUCOPHAGE) 500 mg tablet; Take 1 tablet (500 mg total) by mouth 2 (two) times a day with meals for 90 days  -     sitaGLIPtin (JANUVIA) 50 mg tablet; Take 1 tablet (50 mg total) by mouth daily for 90 days  -     Comprehensive metabolic panel; Future  -     Hemoglobin A1C; Future  -     Lipid panel; Future  -     Cologuard; Future  -     Comprehensive metabolic panel  -     Hemoglobin A1C  -     Lipid panel    Essential hypertension  -     amLODIPine (NORVASC) 10 mg tablet; Take 1 tablet (10 mg total) by mouth daily for 90 days  -     Comprehensive metabolic panel; Future  -     Hemoglobin A1C; Future  -     Lipid panel; Future  -     Cologuard; Future  -     Comprehensive metabolic panel  -     Hemoglobin A1C  -     Lipid panel    Class 1 obesity due to excess calories without serious comorbidity with body mass index (BMI) of 33 0 to 33 9 in adult  -     atorvastatin (LIPITOR) 10 mg tablet; Take 1 tablet (10 mg total) by mouth daily for 90 days  -     Comprehensive metabolic panel; Future  -     Hemoglobin A1C; Future  -     Lipid panel; Future  -     Cologuard; Future  -     Comprehensive metabolic panel  -     Hemoglobin A1C  -     Lipid panel    Hyperlipidemia, mixed  -     atorvastatin (LIPITOR) 10 mg tablet; Take 1 tablet (10 mg total) by mouth daily for 90 days  -     Comprehensive metabolic panel; Future  -     Hemoglobin A1C; Future  -     Lipid panel; Future  -     Cologuard; Future  -     Comprehensive metabolic panel  -     Hemoglobin A1C  -     Lipid panel    Leg cramps  -     Magnesium; Future  -     Magnesium  -     Comprehensive metabolic panel; Future  -     Hemoglobin A1C; Future  -     Lipid panel;  Future  -     Cologuard; Future  -     Comprehensive metabolic panel  -     Hemoglobin A1C  -     Lipid panel    Screening for colon cancer  -     Comprehensive metabolic panel; Future  -     Hemoglobin A1C; Future  -     Lipid panel; Future  -     Cologuard; Future  -     Comprehensive metabolic panel  -     Hemoglobin A1C  -     Lipid panel    Long term current use of diuretic  -     Potassium; Future  -     Potassium  -     lisinopril-hydrochlorothiazide (PRINZIDE,ZESTORETIC) 10-12 5 MG per tablet; Take 1 tablet by mouth daily        No problem-specific Assessment & Plan notes found for this encounter  Subjective:      Patient ID: Gill Hawkins is a 52 y o  male  Patient is here for follow up  Labs were done in May  His only complaint is that once a week he develops very bad cramps in his legs and he needs to get up and walk around to alleviate the pain      Results for Tima Eller (MRN [id number]) as of 2019 18:36    2019 10:54  Sodium: 138  Potassium: 3 6  Chloride: 102  CO2: 22  BUN: 16  Creatinine: 0 95  SL AMB BUN/CREATININE RATIO: 17  Glucose, Random: 113 (H)  AST: 29  ALT: 41  Total Protein: 7 2  Albumin: 4 4  TOTAL BILIRUBIN: 0 4  eGFR : 104  eGFR Non African American: 90  Albumin/Globulin Ratio: 1 6  Cholesterol: 126  Triglycerides: 119  HDL: 28 (L)  LDL Direct: 74  VLDL Cholesterol Isaias: 24  ALKALINE PHOSPHATASE ISOENZYMES: 102  Globulin, Total: 2 8  Hemoglobin A1C: 7 1 (H)  EA  CALCIUM: 9 6  White Blood Cell Count: 5 9  Red Blood Cell Count: 4 87  Hemoglobin: 13 8  HCT: 40 9  MCV: 84  MCH: 28 3  MCHC : 33 7  RDW: 14 4  Platelet Count: 603  Neutrophils: 44  Lymphocytes: 29  Monocytes: 24  Eosinophils: 2  Basophils PCT: 1  Neutrophils (Absolute): 2 6  Lymphocytes (Absolute): 1 7  Monocytes (Absolute): 1 4 (H)  Eosinophils (Absolute): 0 1  Basophils ABS: 0 0  Immature Granulocytes: 0  Immature Granulocytes (Absolute): 0 0      HTN-not at goal  DM-home glucose 120-130 in am      The following portions of the patient's history were reviewed and updated as appropriate:   He has a past medical history of Abnormal glucose (05/14/2008), Chest pain (04/23/2008), and Erectile dysfunction  ,  does not have any pertinent problems on file  ,   has a past surgical history that includes No past surgeries  ,  family history includes No Known Problems in his mother  ,   reports that he has never smoked  He has quit using smokeless tobacco  He reports that he does not drink alcohol or use drugs  ,  has No Known Allergies     Current Outpatient Medications   Medication Sig Dispense Refill    amLODIPine (NORVASC) 10 mg tablet Take 1 tablet (10 mg total) by mouth daily for 90 days 90 tablet 3    atorvastatin (LIPITOR) 10 mg tablet Take 1 tablet (10 mg total) by mouth daily for 90 days 90 tablet 3    lisinopril-hydrochlorothiazide (PRINZIDE,ZESTORETIC) 10-12 5 MG per tablet Take 1 tablet by mouth daily 30 tablet 5    metFORMIN (GLUCOPHAGE) 500 mg tablet Take 1 tablet (500 mg total) by mouth 2 (two) times a day with meals for 90 days 180 tablet 3    sitaGLIPtin (JANUVIA) 50 mg tablet Take 1 tablet (50 mg total) by mouth daily for 90 days 90 tablet 2     No current facility-administered medications for this visit  Review of Systems   Constitutional: Negative for fatigue and fever  HENT: Negative for congestion  Eyes: Negative for visual disturbance  Respiratory: Negative for cough and shortness of breath  Cardiovascular: Negative for chest pain, palpitations and leg swelling  Gastrointestinal: Negative for abdominal distention and abdominal pain  Endocrine: Negative for cold intolerance, polydipsia and polyuria  Genitourinary: Negative for difficulty urinating  Musculoskeletal: Negative for back pain and joint swelling  Skin: Negative for color change and rash  Allergic/Immunologic: Negative for immunocompromised state  Neurological: Negative for dizziness and headaches  Hematological: Negative for adenopathy  Psychiatric/Behavioral: Negative for behavioral problems and sleep disturbance  All other systems reviewed and are negative  Objective:  Vitals:    07/16/19 1828   BP: 144/88   BP Location: Left arm   Patient Position: Sitting   Pulse: 79   Resp: 18   SpO2: 98%   Weight: 115 kg (254 lb)   Height: 6' 2" (1 88 m)     Body mass index is 32 61 kg/m²  Physical Exam   Constitutional: He is oriented to person, place, and time  He appears well-developed and well-nourished  No distress  HENT:   Head: Normocephalic and atraumatic  Right Ear: External ear normal    Left Ear: External ear normal    Nose: Nose normal    Mouth/Throat: Oropharynx is clear and moist  No oropharyngeal exudate  Eyes: Pupils are equal, round, and reactive to light  Conjunctivae and EOM are normal  Right eye exhibits no discharge  Left eye exhibits no discharge  No scleral icterus  Neck: Normal range of motion  Neck supple  No JVD present  No thyromegaly present  Cardiovascular: Normal rate, regular rhythm, normal heart sounds and intact distal pulses  Exam reveals no gallop and no friction rub  No murmur heard  Pulmonary/Chest: Effort normal and breath sounds normal  No respiratory distress  Abdominal: Soft  Bowel sounds are normal  He exhibits no distension  There is no tenderness  Musculoskeletal: Normal range of motion  He exhibits no edema or tenderness  Lymphadenopathy:     He has no cervical adenopathy  Neurological: He is alert and oriented to person, place, and time  He has normal reflexes  He displays normal reflexes  No cranial nerve deficit or sensory deficit  Coordination normal    Skin: Skin is warm and dry  He is not diaphoretic  Psychiatric: He has a normal mood and affect  His behavior is normal  Judgment and thought content normal    Nursing note and vitals reviewed

## 2019-07-16 ENCOUNTER — OFFICE VISIT (OUTPATIENT)
Dept: FAMILY MEDICINE CLINIC | Facility: CLINIC | Age: 54
End: 2019-07-16
Payer: COMMERCIAL

## 2019-07-16 VITALS
WEIGHT: 254 LBS | SYSTOLIC BLOOD PRESSURE: 144 MMHG | BODY MASS INDEX: 32.6 KG/M2 | RESPIRATION RATE: 18 BRPM | DIASTOLIC BLOOD PRESSURE: 88 MMHG | HEART RATE: 79 BPM | HEIGHT: 74 IN | OXYGEN SATURATION: 98 %

## 2019-07-16 DIAGNOSIS — E78.2 HYPERLIPIDEMIA, MIXED: ICD-10-CM

## 2019-07-16 DIAGNOSIS — Z12.11 SCREENING FOR COLON CANCER: ICD-10-CM

## 2019-07-16 DIAGNOSIS — I10 ESSENTIAL HYPERTENSION: ICD-10-CM

## 2019-07-16 DIAGNOSIS — E11.9 TYPE 2 DIABETES MELLITUS WITHOUT COMPLICATION, WITHOUT LONG-TERM CURRENT USE OF INSULIN (HCC): Primary | ICD-10-CM

## 2019-07-16 DIAGNOSIS — E66.09 CLASS 1 OBESITY DUE TO EXCESS CALORIES WITHOUT SERIOUS COMORBIDITY WITH BODY MASS INDEX (BMI) OF 33.0 TO 33.9 IN ADULT: ICD-10-CM

## 2019-07-16 DIAGNOSIS — R25.2 LEG CRAMPS: ICD-10-CM

## 2019-07-16 DIAGNOSIS — Z79.899 LONG TERM CURRENT USE OF DIURETIC: ICD-10-CM

## 2019-07-16 PROCEDURE — 3008F BODY MASS INDEX DOCD: CPT | Performed by: NURSE PRACTITIONER

## 2019-07-16 PROCEDURE — 99214 OFFICE O/P EST MOD 30 MIN: CPT | Performed by: NURSE PRACTITIONER

## 2019-07-16 PROCEDURE — 1036F TOBACCO NON-USER: CPT | Performed by: NURSE PRACTITIONER

## 2019-07-16 RX ORDER — AMLODIPINE BESYLATE 10 MG/1
10 TABLET ORAL DAILY
Qty: 90 TABLET | Refills: 3 | Status: SHIPPED | OUTPATIENT
Start: 2019-07-16 | End: 2020-03-26

## 2019-07-16 RX ORDER — LISINOPRIL AND HYDROCHLOROTHIAZIDE 12.5; 1 MG/1; MG/1
1 TABLET ORAL DAILY
Qty: 30 TABLET | Refills: 5 | Status: SHIPPED | OUTPATIENT
Start: 2019-07-16 | End: 2019-11-23 | Stop reason: SDUPTHER

## 2019-07-16 RX ORDER — ATORVASTATIN CALCIUM 10 MG/1
10 TABLET, FILM COATED ORAL DAILY
Qty: 90 TABLET | Refills: 3 | Status: SHIPPED | OUTPATIENT
Start: 2019-07-16 | End: 2019-12-03 | Stop reason: SDUPTHER

## 2019-07-16 NOTE — PATIENT INSTRUCTIONS
Results for Garret Solis (MRN [id number]) as of 7/16/2019 18:36   Ref  Range 5/22/2019 10:54   Sodium Latest Ref Range: 134 - 144 mmol/L 138   Potassium Latest Ref Range: 3 5 - 5 2 mmol/L 3 6   Chloride Latest Ref Range: 96 - 106 mmol/L 102   CO2 Latest Ref Range: 20 - 29 mmol/L 22   BUN Latest Ref Range: 6 - 24 mg/dL 16   Creatinine Latest Ref Range: 0 76 - 1 27 mg/dL 0 95   SL AMB BUN/CREATININE RATIO Latest Ref Range: 9 - 20  17   Glucose, Random Latest Ref Range: 65 - 99 mg/dL 113 (H)   AST Latest Ref Range: 0 - 40 IU/L 29   ALT Latest Ref Range: 0 - 44 IU/L 41   Total Protein Latest Ref Range: 6 0 - 8 5 g/dL 7 2   Albumin Latest Ref Range: 3 5 - 5 5 g/dL 4 4   TOTAL BILIRUBIN Latest Ref Range: 0 0 - 1 2 mg/dL 0 4   eGFR African American Latest Ref Range: >59 mL/min/1 73 104   eGFR Non  Latest Ref Range: >59 mL/min/1 73 90   Albumin/Globulin Ratio Latest Ref Range: 1 2 - 2 2  1 6   Cholesterol Latest Ref Range: 100 - 199 mg/dL 126   Triglycerides Latest Ref Range: 0 - 149 mg/dL 119   HDL Latest Ref Range: >39 mg/dL 28 (L)   LDL Direct Latest Ref Range: 0 - 99 mg/dL 74   VLDL Cholesterol Isaias Latest Ref Range: 5 - 40 mg/dL 24   ALKALINE PHOSPHATASE ISOENZYMES Latest Ref Range: 39 - 117 IU/L 102   Globulin, Total Latest Ref Range: 1 5 - 4 5 g/dL 2 8   Hemoglobin A1C Latest Ref Range: 4 8 - 5 6 % 7 1 (H)   EAG Latest Units: mg/dL 157   CALCIUM Latest Ref Range: 8 7 - 10 2 mg/dL 9 6   White Blood Cell Count Latest Ref Range: 3 4 - 10 8 x10E3/uL 5 9   Red Blood Cell Count Latest Ref Range: 4 14 - 5 80 x10E6/uL 4 87   Hemoglobin Latest Ref Range: 13 0 - 17 7 g/dL 13 8   HCT Latest Ref Range: 37 5 - 51 0 % 40 9   MCV Latest Ref Range: 79 - 97 fL 84   MCH Latest Ref Range: 26 6 - 33 0 pg 28 3   MCHC   Latest Ref Range: 31 5 - 35 7 g/dL 33 7   RDW Latest Ref Range: 12 3 - 15 4 % 14 4   Platelet Count Latest Ref Range: 150 - 450 x10E3/uL 338   Neutrophils Latest Ref Range: Not Estab  % 44   Lymphocytes Latest Ref Range: Not Estab  % 29   Monocytes Latest Ref Range: Not Estab  % 24   Eosinophils Latest Ref Range: Not Estab  % 2   Basophils PCT Latest Ref Range: Not Estab  % 1   Neutrophils (Absolute) Latest Ref Range: 1 4 - 7 0 x10E3/uL 2 6   Lymphocytes (Absolute) Latest Ref Range: 0 7 - 3 1 x10E3/uL 1 7   Monocytes (Absolute) Latest Ref Range: 0 1 - 0 9 x10E3/uL 1 4 (H)   Eosinophils (Absolute) Latest Ref Range: 0 0 - 0 4 x10E3/uL 0 1   Basophils ABS Latest Ref Range: 0 0 - 0 2 x10E3/uL 0 0   Immature Granulocytes Latest Ref Range: Not Estab  % 0   Immature Granulocytes (Absolute) Latest Ref Range: 0 0 - 0 1 x10E3/uL 0 0   htn- not at goal  Limit salt and processed foods in diet-start lisinopril/HCTZ  DM needs improvement  Add Januvia to current medications  Obesity- encourage weight loss through diet and exercise  Limit carbohydrates and decrease portion sizes  Leg cramps- check magnesium   Increase water intake  Follow up in 4 months    Check potassium and magnesium labs in 2 weeks

## 2019-10-25 DIAGNOSIS — M25.561 CHRONIC PAIN OF BOTH KNEES: Primary | ICD-10-CM

## 2019-10-25 DIAGNOSIS — G89.29 CHRONIC PAIN OF BOTH KNEES: Primary | ICD-10-CM

## 2019-10-25 DIAGNOSIS — M25.562 CHRONIC PAIN OF BOTH KNEES: Primary | ICD-10-CM

## 2019-11-07 DIAGNOSIS — Z12.5 PROSTATE CANCER SCREENING: Primary | ICD-10-CM

## 2019-11-23 DIAGNOSIS — Z79.899 LONG TERM CURRENT USE OF DIURETIC: ICD-10-CM

## 2019-11-23 RX ORDER — LISINOPRIL AND HYDROCHLOROTHIAZIDE 12.5; 1 MG/1; MG/1
1 TABLET ORAL DAILY
Qty: 30 TABLET | Refills: 0 | Status: SHIPPED | OUTPATIENT
Start: 2019-11-23 | End: 2019-12-03

## 2019-11-26 LAB
ALBUMIN SERPL-MCNC: 4.8 G/DL (ref 3.5–5.5)
ALBUMIN/GLOB SERPL: 1.8 {RATIO} (ref 1.2–2.2)
ALP SERPL-CCNC: 94 IU/L (ref 39–117)
ALT SERPL-CCNC: 48 IU/L (ref 0–44)
AST SERPL-CCNC: 27 IU/L (ref 0–40)
BILIRUB SERPL-MCNC: 0.3 MG/DL (ref 0–1.2)
BUN SERPL-MCNC: 18 MG/DL (ref 6–24)
BUN/CREAT SERPL: 21 (ref 9–20)
CALCIUM SERPL-MCNC: 9.8 MG/DL (ref 8.7–10.2)
CHLORIDE SERPL-SCNC: 101 MMOL/L (ref 96–106)
CHOLEST SERPL-MCNC: 137 MG/DL (ref 100–199)
CHOLEST/HDLC SERPL: 3.5 RATIO (ref 0–5)
CO2 SERPL-SCNC: 24 MMOL/L (ref 20–29)
CREAT SERPL-MCNC: 0.87 MG/DL (ref 0.76–1.27)
EST. AVERAGE GLUCOSE BLD GHB EST-MCNC: 154 MG/DL
GLOBULIN SER-MCNC: 2.6 G/DL (ref 1.5–4.5)
GLUCOSE SERPL-MCNC: 124 MG/DL (ref 65–99)
HBA1C MFR BLD: 7 % (ref 4.8–5.6)
HDLC SERPL-MCNC: 39 MG/DL
LDLC SERPL CALC-MCNC: 72 MG/DL (ref 0–99)
MAGNESIUM SERPL-MCNC: 2.5 MG/DL (ref 1.6–2.3)
POTASSIUM SERPL-SCNC: 4.4 MMOL/L (ref 3.5–5.2)
POTASSIUM SERPL-SCNC: 4.4 MMOL/L (ref 3.5–5.2)
PROT SERPL-MCNC: 7.4 G/DL (ref 6–8.5)
PSA SERPL-MCNC: 1.2 NG/ML (ref 0–4)
SL AMB EGFR AFRICAN AMERICAN: 112 ML/MIN/1.73
SL AMB EGFR NON AFRICAN AMERICAN: 97 ML/MIN/1.73
SL AMB VLDL CHOLESTEROL CALC: 26 MG/DL (ref 5–40)
SODIUM SERPL-SCNC: 140 MMOL/L (ref 134–144)
TRIGL SERPL-MCNC: 130 MG/DL (ref 0–149)

## 2019-11-26 PROCEDURE — 3051F HG A1C>EQUAL 7.0%<8.0%: CPT | Performed by: NURSE PRACTITIONER

## 2019-12-03 ENCOUNTER — OFFICE VISIT (OUTPATIENT)
Dept: FAMILY MEDICINE CLINIC | Facility: CLINIC | Age: 54
End: 2019-12-03
Payer: COMMERCIAL

## 2019-12-03 VITALS
HEART RATE: 80 BPM | DIASTOLIC BLOOD PRESSURE: 82 MMHG | OXYGEN SATURATION: 97 % | TEMPERATURE: 97.2 F | SYSTOLIC BLOOD PRESSURE: 132 MMHG | BODY MASS INDEX: 34.14 KG/M2 | HEIGHT: 74 IN | RESPIRATION RATE: 18 BRPM | WEIGHT: 266 LBS

## 2019-12-03 DIAGNOSIS — Z23 NEED FOR INFLUENZA VACCINATION: ICD-10-CM

## 2019-12-03 DIAGNOSIS — Z11.59 NEED FOR HEPATITIS C SCREENING TEST: ICD-10-CM

## 2019-12-03 DIAGNOSIS — E78.2 HYPERLIPIDEMIA, MIXED: ICD-10-CM

## 2019-12-03 DIAGNOSIS — M25.562 CHRONIC PAIN OF BOTH KNEES: ICD-10-CM

## 2019-12-03 DIAGNOSIS — G47.33 OSA (OBSTRUCTIVE SLEEP APNEA): ICD-10-CM

## 2019-12-03 DIAGNOSIS — Z11.4 SCREENING FOR HIV (HUMAN IMMUNODEFICIENCY VIRUS): ICD-10-CM

## 2019-12-03 DIAGNOSIS — E11.9 TYPE 2 DIABETES MELLITUS WITHOUT COMPLICATION, WITHOUT LONG-TERM CURRENT USE OF INSULIN (HCC): Primary | ICD-10-CM

## 2019-12-03 DIAGNOSIS — Z79.899 LONG TERM CURRENT USE OF DIURETIC: ICD-10-CM

## 2019-12-03 DIAGNOSIS — E66.09 CLASS 1 OBESITY DUE TO EXCESS CALORIES WITHOUT SERIOUS COMORBIDITY WITH BODY MASS INDEX (BMI) OF 33.0 TO 33.9 IN ADULT: ICD-10-CM

## 2019-12-03 DIAGNOSIS — R35.0 FREQUENCY OF URINATION: ICD-10-CM

## 2019-12-03 DIAGNOSIS — Z23 PNEUMOCOCCAL VACCINATION ADMINISTERED AT CURRENT VISIT: ICD-10-CM

## 2019-12-03 DIAGNOSIS — G89.29 CHRONIC PAIN OF BOTH KNEES: ICD-10-CM

## 2019-12-03 DIAGNOSIS — M25.511 ACUTE PAIN OF BOTH SHOULDERS: ICD-10-CM

## 2019-12-03 DIAGNOSIS — M25.512 ACUTE PAIN OF BOTH SHOULDERS: ICD-10-CM

## 2019-12-03 DIAGNOSIS — I10 ESSENTIAL HYPERTENSION: ICD-10-CM

## 2019-12-03 DIAGNOSIS — M25.561 CHRONIC PAIN OF BOTH KNEES: ICD-10-CM

## 2019-12-03 PROCEDURE — 4010F ACE/ARB THERAPY RXD/TAKEN: CPT | Performed by: NURSE PRACTITIONER

## 2019-12-03 PROCEDURE — 90682 RIV4 VACC RECOMBINANT DNA IM: CPT | Performed by: FAMILY MEDICINE

## 2019-12-03 PROCEDURE — 92250 FUNDUS PHOTOGRAPHY W/I&R: CPT | Performed by: NURSE PRACTITIONER

## 2019-12-03 PROCEDURE — 3079F DIAST BP 80-89 MM HG: CPT | Performed by: NURSE PRACTITIONER

## 2019-12-03 PROCEDURE — 90472 IMMUNIZATION ADMIN EACH ADD: CPT | Performed by: FAMILY MEDICINE

## 2019-12-03 PROCEDURE — 1036F TOBACCO NON-USER: CPT | Performed by: NURSE PRACTITIONER

## 2019-12-03 PROCEDURE — 90670 PCV13 VACCINE IM: CPT | Performed by: FAMILY MEDICINE

## 2019-12-03 PROCEDURE — 3075F SYST BP GE 130 - 139MM HG: CPT | Performed by: NURSE PRACTITIONER

## 2019-12-03 PROCEDURE — 90471 IMMUNIZATION ADMIN: CPT | Performed by: FAMILY MEDICINE

## 2019-12-03 PROCEDURE — 99214 OFFICE O/P EST MOD 30 MIN: CPT | Performed by: NURSE PRACTITIONER

## 2019-12-03 PROCEDURE — 3008F BODY MASS INDEX DOCD: CPT | Performed by: NURSE PRACTITIONER

## 2019-12-03 RX ORDER — ATORVASTATIN CALCIUM 10 MG/1
10 TABLET, FILM COATED ORAL DAILY
Qty: 90 TABLET | Refills: 3 | Status: SHIPPED | OUTPATIENT
Start: 2019-12-03 | End: 2020-11-09

## 2019-12-03 RX ORDER — TAMSULOSIN HYDROCHLORIDE 0.4 MG/1
0.4 CAPSULE ORAL
Qty: 90 CAPSULE | Refills: 2 | Status: SHIPPED | OUTPATIENT
Start: 2019-12-03 | End: 2019-12-19 | Stop reason: SDUPTHER

## 2019-12-03 RX ORDER — MELOXICAM 15 MG/1
15 TABLET ORAL DAILY
Qty: 30 TABLET | Refills: 5 | Status: SHIPPED | OUTPATIENT
Start: 2019-12-03 | End: 2020-03-30

## 2019-12-03 RX ORDER — LOSARTAN POTASSIUM 50 MG/1
50 TABLET ORAL DAILY
Qty: 90 TABLET | Refills: 2 | Status: SHIPPED | OUTPATIENT
Start: 2019-12-03 | End: 2020-05-21

## 2019-12-03 RX ORDER — TAMSULOSIN HYDROCHLORIDE 0.4 MG/1
0.4 CAPSULE ORAL
Refills: 6 | COMMUNITY
Start: 2019-10-24 | End: 2019-12-03 | Stop reason: SDUPTHER

## 2019-12-03 RX ORDER — HYDROCHLOROTHIAZIDE 25 MG/1
12.5 TABLET ORAL DAILY
Qty: 90 TABLET | Refills: 3 | Status: SHIPPED | OUTPATIENT
Start: 2019-12-03 | End: 2020-10-06

## 2019-12-03 NOTE — PROGRESS NOTES
Assessment/Plan:       Diagnoses and all orders for this visit:    Type 2 diabetes mellitus without complication, without long-term current use of insulin ([facility] )  -     IRIS Diabetic eye exam  -     Comprehensive metabolic panel; Future  -     Lipid panel; Future  -     Hemoglobin A1C; Future  -     Comprehensive metabolic panel  -     Lipid panel  -     Hemoglobin A1C  -     atorvastatin (LIPITOR) 10 mg tablet; Take 1 tablet (10 mg total) by mouth daily  -     metFORMIN (GLUCOPHAGE) 500 mg tablet; Take 1 tablet (500 mg total) by mouth 2 (two) times a day with meals  -     sitaGLIPtin (JANUVIA) 100 mg tablet; Take 1 tablet (100 mg total) by mouth daily    Essential hypertension  -     IRIS Diabetic eye exam  -     Comprehensive metabolic panel; Future  -     Lipid panel; Future  -     Comprehensive metabolic panel  -     Lipid panel  -     hydrochlorothiazide (HYDRODIURIL) 25 mg tablet; Take 0 5 tablets (12 5 mg total) by mouth daily  -     losartan (COZAAR) 50 mg tablet; Take 1 tablet (50 mg total) by mouth daily    Hyperlipidemia, mixed  -     Comprehensive metabolic panel; Future  -     Lipid panel; Future  -     Comprehensive metabolic panel  -     Lipid panel  -     atorvastatin (LIPITOR) 10 mg tablet; Take 1 tablet (10 mg total) by mouth daily    Chronic pain of both knees  -     Ambulatory referral to Sports Medicine; Future  -     meloxicam (MOBIC) 15 mg tablet; Take 1 tablet (15 mg total) by mouth daily    Long term current use of diuretic    Screening for HIV (human immunodeficiency virus)  -     Human Immunodeficiency Virus 1/2 Antigen / Antibody ( Fourth Generation) with Reflex Testing; Future  -     Human Immunodeficiency Virus 1/2 Antigen / Antibody ( Fourth Generation) with Reflex Testing    Acute pain of both shoulders  -     XR shoulder 2+ vw right; Future  -     XR shoulder 2+ vw left; Future  -     Ambulatory referral to Sports Medicine; Future  -     meloxicam (MOBIC) 15 mg tablet;  Take 1 tablet (15 mg total) by mouth daily    Class 1 obesity due to excess calories without serious comorbidity with body mass index (BMI) of 33 0 to 33 9 in adult  -     atorvastatin (LIPITOR) 10 mg tablet; Take 1 tablet (10 mg total) by mouth daily    MIRIAN (obstructive sleep apnea)  -     Sleep CPAP setup with DME; Future  -     CPAP Study; Future    Frequency of urination  -     tamsulosin (FLOMAX) 0 4 mg; Take 1 capsule (0 4 mg total) by mouth daily with dinner    Need for hepatitis C screening test  -     Hepatitis C antibody; Future  -     Hepatitis C antibody    Pneumococcal vaccination administered at current visit  -     PNEUMOCOCCAL CONJUGATE VACCINE 13-VALENT GREATER THAN 6 MONTHS    Need for influenza vaccination  -     influenza vaccine, [id number], quadrivalent, recombinant, PF, 0 5 mL, for patients 18 yr+ (FLUBLOK)    Other orders  -     Discontinue: tamsulosin (FLOMAX) 0 4 mg; Take 0 4 mg by mouth daily with dinner  -     Discontinue: metFORMIN (GLUCOPHAGE) 500 mg tablet; Take 500 mg by mouth 2 (two) times a day with meals        No problem-specific Assessment & Plan notes found for this encounter  Subjective:      Patient ID: Caesar Senior is a 52 y o  male  Patient is here for follow up of chronic medical conditions and lab review  Since his last visit, he is noticing a cough  Cough is worse at nighttime  Cough is wet  He did start Ace inhibitor since his last visit  Shoulder pain- both shoulders  He works full time in construction and as an   He has pain anterior shoulder into deltoid  Pain is worse left side  He has pain with posterior arm raise  He is taking advil daily which is not helping with the pain  He has left knee pain- Diclofenac topical is not effective for pain relief,    Diabetes-A1C is improving  HTN-stable  HLD- stable  Obesity- weight is rising  No purposeful exercise  Active with work  He is not following any particular diet but he does try and watch his sugar intake  He does not feel like his sleep is restful  He does snore  He reports that his insurance will not pay for a new CPAP machine  He stopped his CPAP a few years ago when he felt like he couldn't breathe  Results for Maurilio Peng (MRN [id number]) as of 12/3/2019 11:52    2019 07:13  Sodium: 140  Potassium: 4 4  Chloride: 101  CO2: 24  BUN: 18  Creatinine: 0 87  SL AMB BUN/CREATININE RATIO: 21 (H)  Glucose, Random: 124 (H)  AST: 27  ALT: 48 (H)  Total Protein: 7 4  Albumin: 4 8  TOTAL BILIRUBIN: 0 3  eGFR : 112  eGFR Non African American: 97  Albumin/Globulin Ratio: 1 8  Cholesterol: 137  Triglycerides: 130  HDL: 39 (L)  LDL Direct: 72  VLDL Cholesterol Isaias: 26  ALKALINE PHOSPHATASE ISOENZYMES: 94  Globulin, Total: 2 6  Hemoglobin A1C: 7 0 (H)  EA  CALCIUM: 9 8  T  Chol/HDL Ratio: 3 5        The following portions of the patient's history were reviewed and updated as appropriate:   He has a past medical history of Abnormal glucose (2008), Chest pain (2008), and Erectile dysfunction  ,  does not have any pertinent problems on file  ,   has a past surgical history that includes No past surgeries  ,  family history includes No Known Problems in his mother  ,   reports that he has never smoked  He has quit using smokeless tobacco  He reports that he does not drink alcohol or use drugs  ,  has No Known Allergies     Current Outpatient Medications   Medication Sig Dispense Refill    amLODIPine (NORVASC) 10 mg tablet Take 1 tablet (10 mg total) by mouth daily for 90 days 90 tablet 3    atorvastatin (LIPITOR) 10 mg tablet Take 1 tablet (10 mg total) by mouth daily 90 tablet 3    hydrochlorothiazide (HYDRODIURIL) 25 mg tablet Take 0 5 tablets (12 5 mg total) by mouth daily 90 tablet 3    losartan (COZAAR) 50 mg tablet Take 1 tablet (50 mg total) by mouth daily 90 tablet 2    meloxicam (MOBIC) 15 mg tablet Take 1 tablet (15 mg total) by mouth daily 30 tablet 5    metFORMIN (GLUCOPHAGE) 500 mg tablet Take 1 tablet (500 mg total) by mouth 2 (two) times a day with meals 180 tablet 3    sitaGLIPtin (JANUVIA) 100 mg tablet Take 1 tablet (100 mg total) by mouth daily 90 tablet 3    tamsulosin (FLOMAX) 0 4 mg Take 1 capsule (0 4 mg total) by mouth daily with dinner 90 capsule 2     No current facility-administered medications for this visit  Review of Systems   Constitutional: Negative for fatigue and fever  HENT: Negative for congestion  Eyes: Negative for visual disturbance  Respiratory: Negative for cough and shortness of breath  Cardiovascular: Negative for chest pain, palpitations and leg swelling  Gastrointestinal: Negative for abdominal distention and abdominal pain  Endocrine: Negative for cold intolerance, polydipsia and polyuria  Genitourinary: Negative for difficulty urinating  Musculoskeletal: Positive for back pain  Negative for joint swelling  Bilateral shoulder pain, left knee pain   Skin: Negative for color change and rash  Allergic/Immunologic: Negative for immunocompromised state  Neurological: Negative for dizziness and headaches  Hematological: Negative for adenopathy  Psychiatric/Behavioral: Negative for behavioral problems and sleep disturbance  All other systems reviewed and are negative  Objective:  Vitals:    12/03/19 1829   BP: 132/82   BP Location: Left arm   Patient Position: Sitting   Pulse: 80   Resp: 18   Temp: (!) 97 2 °F (36 2 °C)   SpO2: 97%   Weight: 121 kg (266 lb)   Height: 6' 2" (1 88 m)     Body mass index is 34 15 kg/m²  Physical Exam   Constitutional: He is oriented to person, place, and time  He appears well-developed and well-nourished  No distress  HENT:   Head: Normocephalic and atraumatic  Right Ear: External ear normal    Left Ear: External ear normal    Nose: Nose normal    Mouth/Throat: Oropharynx is clear and moist  No oropharyngeal exudate     Eyes: Pupils are equal, round, and reactive to light  Conjunctivae and EOM are normal  Right eye exhibits no discharge  Left eye exhibits no discharge  No scleral icterus  Neck: Normal range of motion  Neck supple  No JVD present  No thyromegaly present  Cardiovascular: Normal rate, regular rhythm, normal heart sounds and intact distal pulses  Exam reveals no gallop and no friction rub  Pulses are no weak pulses  No murmur heard  Pulses:       Dorsalis pedis pulses are 2+ on the right side, and 2+ on the left side  Posterior tibial pulses are 2+ on the right side, and 2+ on the left side  Pulmonary/Chest: Effort normal and breath sounds normal  No respiratory distress  He exhibits no tenderness  Abdominal: Soft  Bowel sounds are normal  He exhibits no distension  There is no tenderness  Musculoskeletal: He exhibits tenderness  He exhibits no edema  Limited lateral raise bilaterally, point tenderness anterior deltoid  Feet:   Right Foot:   Skin Integrity: Positive for dry skin  Negative for ulcer, skin breakdown, erythema, warmth or callus  Left Foot:   Skin Integrity: Positive for dry skin  Negative for ulcer, skin breakdown, erythema, warmth or callus  Lymphadenopathy:     He has no cervical adenopathy  Neurological: He is alert and oriented to person, place, and time  He has normal reflexes  No cranial nerve deficit  Coordination normal    Skin: Skin is warm and dry  Capillary refill takes less than 2 seconds  He is not diaphoretic  Psychiatric: He has a normal mood and affect  His behavior is normal  Judgment and thought content normal    Nursing note and vitals reviewed  Diabetic Foot Exam    Patient's shoes and socks removed  Right Foot/Ankle   Right Foot Inspection  Skin Exam: skin normal, skin intact and dry skin no warmth, no callus, no erythema, no maceration, no abnormal color, no pre-ulcer, no ulcer and no callus                          Toe Exam: ROM and strength within normal limits  Sensory Vibration: intact  Proprioception: intact   Monofilament testing: intact  Vascular  Capillary refills: < 3 seconds  The right DP pulse is 2+  The right PT pulse is 2+  Right Toe  - Comprehensive Exam  Ecchymosis: none  Arch: normal  Hammertoes: absent  Claw Toes: absent  Swelling: none   Tenderness: none         Left Foot/Ankle  Left Foot Inspection  Skin Exam: skin normal, skin intact and dry skinno warmth, no erythema, no maceration, normal color, no pre-ulcer, no ulcer and no callus                         Toe Exam: ROM and strength within normal limits                   Sensory   Vibration: intact  Proprioception: intact  Monofilament: intact  Vascular  Capillary refills: < 3 seconds  The left DP pulse is 2+  The left PT pulse is 2+  Left Toe  - Comprehensive Exam  Ecchymosis: none  Arch: normal  Hammertoes: absent  Claw toes: absent  Swelling: none   Tenderness: none       Assign Risk Category:  No deformity present; No loss of protective sensation;  No weak pulses       Risk: 0

## 2019-12-03 NOTE — PATIENT INSTRUCTIONS
Follow up visit:  Hypertension- encourage low sodium diet  Cough possibly due to lisinopril  Stop Lisinopril  Start Losartan daily  (the diuretic part of this medication will be prescribed separately)  Hyperlipidemia- stable  Refill atorvastatin at current dose  Urinary frequency due to BPH, followed by Urology- refill tamsulosin  Diabetes- A1C is improved but can be better  Increase januvia to 100 mg daily  Encouraged low carbohydrates  Bilateral shoulder pain- obtain xray  Refer to Dr Jose Luis Gaming for ortho  Start Mobic 15 mg daily for pain  Left knee pain- known meniscal problem  Pain worse  Diclofenac is not helping pain  Start Mobic, Ortho referral  Sleep apnea- stopped machine a few years ago  He will need CPAP titration study and new machine  Flu shot and pneumonia vaccination given today  Follow up in 4 months  Obtain labs one week prior to visit  Results for Antonia Smith (MRN [id number]) as of 12/3/2019 18:37   Ref   Range 11/25/2019 07:13   Sodium Latest Ref Range: 134 - 144 mmol/L 140   Potassium Latest Ref Range: 3 5 - 5 2 mmol/L 4 4   Chloride Latest Ref Range: 96 - 106 mmol/L 101   CO2 Latest Ref Range: 20 - 29 mmol/L 24   BUN Latest Ref Range: 6 - 24 mg/dL 18   Creatinine Latest Ref Range: 0 76 - 1 27 mg/dL 0 87   SL AMB BUN/CREATININE RATIO Latest Ref Range: 9 - 20  21 (H)   Glucose, Random Latest Ref Range: 65 - 99 mg/dL 124 (H)   AST Latest Ref Range: 0 - 40 IU/L 27   ALT Latest Ref Range: 0 - 44 IU/L 48 (H)   Total Protein Latest Ref Range: 6 0 - 8 5 g/dL 7 4   Albumin Latest Ref Range: 3 5 - 5 5 g/dL 4 8   TOTAL BILIRUBIN Latest Ref Range: 0 0 - 1 2 mg/dL 0 3   eGFR African American Latest Ref Range: >59 mL/min/1 73 112   eGFR Non  Latest Ref Range: >59 mL/min/1 73 97   Albumin/Globulin Ratio Latest Ref Range: 1 2 - 2 2  1 8   Cholesterol Latest Ref Range: 100 - 199 mg/dL 137   Triglycerides Latest Ref Range: 0 - 149 mg/dL 130   HDL Latest Ref Range: >39 mg/dL 39 (L)   LDL Direct Latest Ref Range: 0 - 99 mg/dL 72   VLDL Cholesterol Isaias Latest Ref Range: 5 - 40 mg/dL 26   ALKALINE PHOSPHATASE ISOENZYMES Latest Ref Range: 39 - 117 IU/L 94   Globulin, Total Latest Ref Range: 1 5 - 4 5 g/dL 2 6   Hemoglobin A1C Latest Ref Range: 4 8 - 5 6 % 7 0 (H)   EAG Latest Units: mg/dL 154   CALCIUM Latest Ref Range: 8 7 - 10 2 mg/dL 9 8   T   Chol/HDL Ratio Latest Ref Range: 0 0 - 5 0 ratio 3 5

## 2019-12-04 LAB
LEFT EYE DIABETIC RETINOPATHY: NORMAL
LEFT EYE IMAGE QUALITY: NORMAL
LEFT EYE MACULAR EDEMA: NORMAL
LEFT EYE OTHER RETINOPATHY: NORMAL
RIGHT EYE DIABETIC RETINOPATHY: NORMAL
RIGHT EYE IMAGE QUALITY: NORMAL
RIGHT EYE MACULAR EDEMA: NORMAL
RIGHT EYE OTHER RETINOPATHY: NORMAL
SEVERITY (EYE EXAM): NORMAL

## 2019-12-04 PROCEDURE — 2023F DILAT RTA XM W/O RTNOPTHY: CPT | Performed by: NURSE PRACTITIONER

## 2019-12-19 DIAGNOSIS — N39.0 URINARY TRACT INFECTION WITH HEMATURIA, SITE UNSPECIFIED: ICD-10-CM

## 2019-12-19 DIAGNOSIS — R31.9 URINARY TRACT INFECTION WITH HEMATURIA, SITE UNSPECIFIED: ICD-10-CM

## 2019-12-19 RX ORDER — TAMSULOSIN HYDROCHLORIDE 0.4 MG/1
CAPSULE ORAL
Qty: 30 CAPSULE | Refills: 0 | Status: SHIPPED | OUTPATIENT
Start: 2019-12-19 | End: 2020-03-09

## 2019-12-27 ENCOUNTER — TRANSCRIBE ORDERS (OUTPATIENT)
Dept: SLEEP CENTER | Facility: CLINIC | Age: 54
End: 2019-12-27

## 2019-12-31 ENCOUNTER — TRANSCRIBE ORDERS (OUTPATIENT)
Dept: ADMINISTRATIVE | Facility: HOSPITAL | Age: 54
End: 2019-12-31

## 2019-12-31 DIAGNOSIS — R06.83 SNORING: ICD-10-CM

## 2019-12-31 DIAGNOSIS — G47.30 SLEEP APNEA, UNSPECIFIED TYPE: Primary | ICD-10-CM

## 2020-01-31 ENCOUNTER — TELEPHONE (OUTPATIENT)
Dept: FAMILY MEDICINE CLINIC | Facility: CLINIC | Age: 55
End: 2020-01-31

## 2020-01-31 NOTE — TELEPHONE ENCOUNTER
S/W patient regarding his Cologuard kit that has not yet been completed - patient stated that he has not yet had the time and will try to do it this weekend

## 2020-03-07 DIAGNOSIS — N39.0 URINARY TRACT INFECTION WITH HEMATURIA, SITE UNSPECIFIED: ICD-10-CM

## 2020-03-07 DIAGNOSIS — R31.9 URINARY TRACT INFECTION WITH HEMATURIA, SITE UNSPECIFIED: ICD-10-CM

## 2020-03-09 RX ORDER — TAMSULOSIN HYDROCHLORIDE 0.4 MG/1
CAPSULE ORAL
Qty: 30 CAPSULE | Refills: 0 | Status: SHIPPED | OUTPATIENT
Start: 2020-03-09 | End: 2020-06-04

## 2020-03-26 DIAGNOSIS — I10 ESSENTIAL HYPERTENSION: ICD-10-CM

## 2020-03-26 RX ORDER — AMLODIPINE BESYLATE 10 MG/1
10 TABLET ORAL DAILY
Qty: 90 TABLET | Refills: 3 | Status: SHIPPED | OUTPATIENT
Start: 2020-03-26 | End: 2020-04-17 | Stop reason: SDUPTHER

## 2020-03-28 DIAGNOSIS — M25.562 CHRONIC PAIN OF BOTH KNEES: ICD-10-CM

## 2020-03-28 DIAGNOSIS — M25.512 ACUTE PAIN OF BOTH SHOULDERS: ICD-10-CM

## 2020-03-28 DIAGNOSIS — G89.29 CHRONIC PAIN OF BOTH KNEES: ICD-10-CM

## 2020-03-28 DIAGNOSIS — M25.511 ACUTE PAIN OF BOTH SHOULDERS: ICD-10-CM

## 2020-03-28 DIAGNOSIS — M25.561 CHRONIC PAIN OF BOTH KNEES: ICD-10-CM

## 2020-03-30 ENCOUNTER — TELEPHONE (OUTPATIENT)
Dept: SLEEP CENTER | Facility: CLINIC | Age: 55
End: 2020-03-30

## 2020-03-30 RX ORDER — MELOXICAM 15 MG/1
TABLET ORAL
Qty: 30 TABLET | Refills: 5 | Status: SHIPPED | OUTPATIENT
Start: 2020-03-30 | End: 2020-11-23

## 2020-04-02 ENCOUNTER — TELEPHONE (OUTPATIENT)
Dept: SLEEP CENTER | Facility: CLINIC | Age: 55
End: 2020-04-02

## 2020-04-15 ENCOUNTER — TELEPHONE (OUTPATIENT)
Dept: FAMILY MEDICINE CLINIC | Facility: CLINIC | Age: 55
End: 2020-04-15

## 2020-04-17 DIAGNOSIS — I10 ESSENTIAL HYPERTENSION: ICD-10-CM

## 2020-04-17 RX ORDER — AMLODIPINE BESYLATE 5 MG/1
5 TABLET ORAL DAILY
Qty: 90 TABLET | Refills: 3 | Status: SHIPPED | OUTPATIENT
Start: 2020-04-17 | End: 2020-11-09 | Stop reason: SDUPTHER

## 2020-04-24 DIAGNOSIS — M25.562 CHRONIC PAIN OF BOTH KNEES: ICD-10-CM

## 2020-04-24 DIAGNOSIS — M25.561 CHRONIC PAIN OF BOTH KNEES: ICD-10-CM

## 2020-04-24 DIAGNOSIS — G89.29 CHRONIC PAIN OF BOTH KNEES: ICD-10-CM

## 2020-05-01 ENCOUNTER — TELEPHONE (OUTPATIENT)
Dept: FAMILY MEDICINE CLINIC | Facility: CLINIC | Age: 55
End: 2020-05-01

## 2020-05-21 DIAGNOSIS — I10 ESSENTIAL HYPERTENSION: ICD-10-CM

## 2020-05-21 RX ORDER — LOSARTAN POTASSIUM 50 MG/1
TABLET ORAL
Qty: 90 TABLET | Refills: 2 | Status: SHIPPED | OUTPATIENT
Start: 2020-05-21 | End: 2020-11-09 | Stop reason: SDUPTHER

## 2020-06-04 DIAGNOSIS — R31.9 URINARY TRACT INFECTION WITH HEMATURIA, SITE UNSPECIFIED: ICD-10-CM

## 2020-06-04 DIAGNOSIS — N39.0 URINARY TRACT INFECTION WITH HEMATURIA, SITE UNSPECIFIED: ICD-10-CM

## 2020-06-04 RX ORDER — TAMSULOSIN HYDROCHLORIDE 0.4 MG/1
CAPSULE ORAL
Qty: 90 CAPSULE | Refills: 1 | Status: SHIPPED | OUTPATIENT
Start: 2020-06-04 | End: 2020-11-09

## 2020-06-10 ENCOUNTER — TELEPHONE (OUTPATIENT)
Dept: SLEEP CENTER | Facility: CLINIC | Age: 55
End: 2020-06-10

## 2020-09-24 DIAGNOSIS — E11.9 TYPE 2 DIABETES MELLITUS WITHOUT COMPLICATION, WITHOUT LONG-TERM CURRENT USE OF INSULIN (HCC): ICD-10-CM

## 2020-10-05 DIAGNOSIS — I10 ESSENTIAL HYPERTENSION: ICD-10-CM

## 2020-10-06 RX ORDER — HYDROCHLOROTHIAZIDE 25 MG/1
TABLET ORAL
Qty: 90 TABLET | Refills: 3 | Status: SHIPPED | OUTPATIENT
Start: 2020-10-06 | End: 2020-11-09 | Stop reason: SDUPTHER

## 2020-11-09 ENCOUNTER — OFFICE VISIT (OUTPATIENT)
Dept: FAMILY MEDICINE CLINIC | Facility: CLINIC | Age: 55
End: 2020-11-09
Payer: COMMERCIAL

## 2020-11-09 VITALS
BODY MASS INDEX: 33.37 KG/M2 | SYSTOLIC BLOOD PRESSURE: 128 MMHG | OXYGEN SATURATION: 98 % | HEART RATE: 88 BPM | DIASTOLIC BLOOD PRESSURE: 74 MMHG | RESPIRATION RATE: 18 BRPM | TEMPERATURE: 98 F | WEIGHT: 260 LBS | HEIGHT: 74 IN

## 2020-11-09 DIAGNOSIS — Z23 NEED FOR INFLUENZA VACCINATION: ICD-10-CM

## 2020-11-09 DIAGNOSIS — G89.29 CHRONIC PAIN OF RIGHT KNEE: ICD-10-CM

## 2020-11-09 DIAGNOSIS — G47.33 OSA (OBSTRUCTIVE SLEEP APNEA): ICD-10-CM

## 2020-11-09 DIAGNOSIS — E11.9 TYPE 2 DIABETES MELLITUS WITHOUT COMPLICATION, WITHOUT LONG-TERM CURRENT USE OF INSULIN (HCC): Primary | ICD-10-CM

## 2020-11-09 DIAGNOSIS — M25.561 CHRONIC PAIN OF RIGHT KNEE: ICD-10-CM

## 2020-11-09 DIAGNOSIS — Z79.899 LONG TERM CURRENT USE OF DIURETIC: ICD-10-CM

## 2020-11-09 DIAGNOSIS — E78.2 HYPERLIPIDEMIA, MIXED: ICD-10-CM

## 2020-11-09 DIAGNOSIS — I10 ESSENTIAL HYPERTENSION: ICD-10-CM

## 2020-11-09 DIAGNOSIS — E66.09 CLASS 1 OBESITY DUE TO EXCESS CALORIES WITHOUT SERIOUS COMORBIDITY WITH BODY MASS INDEX (BMI) OF 33.0 TO 33.9 IN ADULT: ICD-10-CM

## 2020-11-09 PROBLEM — M25.512 ACUTE PAIN OF BOTH SHOULDERS: Status: RESOLVED | Noted: 2019-12-03 | Resolved: 2020-11-09

## 2020-11-09 PROBLEM — G51.4 FACIAL TWITCHING: Status: RESOLVED | Noted: 2018-11-05 | Resolved: 2020-11-09

## 2020-11-09 PROBLEM — B35.1 TOENAIL FUNGUS: Status: RESOLVED | Noted: 2019-02-28 | Resolved: 2020-11-09

## 2020-11-09 PROBLEM — R31.9 URINARY TRACT INFECTION WITH HEMATURIA: Status: RESOLVED | Noted: 2019-05-21 | Resolved: 2020-11-09

## 2020-11-09 PROBLEM — Z11.59 NEED FOR HEPATITIS C SCREENING TEST: Status: RESOLVED | Noted: 2019-12-03 | Resolved: 2020-11-09

## 2020-11-09 PROBLEM — M25.511 ACUTE PAIN OF BOTH SHOULDERS: Status: RESOLVED | Noted: 2019-12-03 | Resolved: 2020-11-09

## 2020-11-09 PROBLEM — R03.0 ELEVATED BLOOD PRESSURE READING WITHOUT DIAGNOSIS OF HYPERTENSION: Status: RESOLVED | Noted: 2018-11-05 | Resolved: 2020-11-09

## 2020-11-09 PROBLEM — S03.40XA SPRAIN OF TEMPOROMANDIBULAR JOINT OR LIGAMENT: Status: RESOLVED | Noted: 2018-11-05 | Resolved: 2020-11-09

## 2020-11-09 PROBLEM — N39.0 URINARY TRACT INFECTION WITH HEMATURIA: Status: RESOLVED | Noted: 2019-05-21 | Resolved: 2020-11-09

## 2020-11-09 PROBLEM — Z11.4 SCREENING FOR HIV (HUMAN IMMUNODEFICIENCY VIRUS): Status: RESOLVED | Noted: 2018-11-05 | Resolved: 2020-11-09

## 2020-11-09 PROBLEM — Z86.39 HISTORY OF HYPERGLYCEMIA: Status: RESOLVED | Noted: 2018-11-05 | Resolved: 2020-11-09

## 2020-11-09 PROBLEM — R35.0 FREQUENCY OF URINATION: Status: RESOLVED | Noted: 2019-05-21 | Resolved: 2020-11-09

## 2020-11-09 LAB — SL AMB POCT HEMOGLOBIN AIC: 7 (ref ?–6.5)

## 2020-11-09 PROCEDURE — 1036F TOBACCO NON-USER: CPT | Performed by: NURSE PRACTITIONER

## 2020-11-09 PROCEDURE — 90471 IMMUNIZATION ADMIN: CPT

## 2020-11-09 PROCEDURE — 3008F BODY MASS INDEX DOCD: CPT | Performed by: NURSE PRACTITIONER

## 2020-11-09 PROCEDURE — 3074F SYST BP LT 130 MM HG: CPT | Performed by: NURSE PRACTITIONER

## 2020-11-09 PROCEDURE — 3725F SCREEN DEPRESSION PERFORMED: CPT | Performed by: NURSE PRACTITIONER

## 2020-11-09 PROCEDURE — 3078F DIAST BP <80 MM HG: CPT | Performed by: NURSE PRACTITIONER

## 2020-11-09 PROCEDURE — 3051F HG A1C>EQUAL 7.0%<8.0%: CPT | Performed by: NURSE PRACTITIONER

## 2020-11-09 PROCEDURE — 83036 HEMOGLOBIN GLYCOSYLATED A1C: CPT | Performed by: NURSE PRACTITIONER

## 2020-11-09 PROCEDURE — 4010F ACE/ARB THERAPY RXD/TAKEN: CPT | Performed by: NURSE PRACTITIONER

## 2020-11-09 PROCEDURE — 90682 RIV4 VACC RECOMBINANT DNA IM: CPT

## 2020-11-09 PROCEDURE — 99214 OFFICE O/P EST MOD 30 MIN: CPT | Performed by: NURSE PRACTITIONER

## 2020-11-09 RX ORDER — HYDROCHLOROTHIAZIDE 25 MG/1
25 TABLET ORAL DAILY
Qty: 90 TABLET | Refills: 3 | Status: SHIPPED | OUTPATIENT
Start: 2020-11-09 | End: 2021-12-12 | Stop reason: SDUPTHER

## 2020-11-09 RX ORDER — ATORVASTATIN CALCIUM 10 MG/1
10 TABLET, FILM COATED ORAL DAILY
Qty: 90 TABLET | Refills: 3 | Status: SHIPPED | OUTPATIENT
Start: 2020-11-09 | End: 2021-08-02

## 2020-11-09 RX ORDER — LOSARTAN POTASSIUM 50 MG/1
50 TABLET ORAL DAILY
Qty: 90 TABLET | Refills: 2 | Status: SHIPPED | OUTPATIENT
Start: 2020-11-09 | End: 2021-08-03

## 2020-11-09 RX ORDER — AMLODIPINE BESYLATE 5 MG/1
5 TABLET ORAL DAILY
Qty: 90 TABLET | Refills: 3 | Status: SHIPPED | OUTPATIENT
Start: 2020-11-09 | End: 2021-08-16

## 2020-11-21 DIAGNOSIS — M25.511 ACUTE PAIN OF BOTH SHOULDERS: ICD-10-CM

## 2020-11-21 DIAGNOSIS — G89.29 CHRONIC PAIN OF BOTH KNEES: ICD-10-CM

## 2020-11-21 DIAGNOSIS — M25.512 ACUTE PAIN OF BOTH SHOULDERS: ICD-10-CM

## 2020-11-21 DIAGNOSIS — M25.562 CHRONIC PAIN OF BOTH KNEES: ICD-10-CM

## 2020-11-21 DIAGNOSIS — M25.561 CHRONIC PAIN OF BOTH KNEES: ICD-10-CM

## 2020-11-23 RX ORDER — MELOXICAM 15 MG/1
TABLET ORAL
Qty: 30 TABLET | Refills: 5 | Status: SHIPPED | OUTPATIENT
Start: 2020-11-23 | End: 2021-03-26

## 2020-12-17 ENCOUNTER — VBI (OUTPATIENT)
Dept: ADMINISTRATIVE | Facility: OTHER | Age: 55
End: 2020-12-17

## 2021-01-11 DIAGNOSIS — E11.9 TYPE 2 DIABETES MELLITUS WITHOUT COMPLICATION, WITHOUT LONG-TERM CURRENT USE OF INSULIN (HCC): ICD-10-CM

## 2021-01-28 LAB
ALBUMIN SERPL-MCNC: 4.3 G/DL (ref 3.8–4.9)
ALBUMIN/GLOB SERPL: 1.4 {RATIO} (ref 1.2–2.2)
ALP SERPL-CCNC: 96 IU/L (ref 39–117)
ALT SERPL-CCNC: 57 IU/L (ref 0–44)
AST SERPL-CCNC: 34 IU/L (ref 0–40)
BILIRUB SERPL-MCNC: 0.4 MG/DL (ref 0–1.2)
BUN SERPL-MCNC: 20 MG/DL (ref 6–24)
BUN/CREAT SERPL: 20 (ref 9–20)
CALCIUM SERPL-MCNC: 9.7 MG/DL (ref 8.7–10.2)
CHLORIDE SERPL-SCNC: 101 MMOL/L (ref 96–106)
CHOLEST SERPL-MCNC: 146 MG/DL (ref 100–199)
CHOLEST/HDLC SERPL: 3.7 RATIO (ref 0–5)
CO2 SERPL-SCNC: 26 MMOL/L (ref 20–29)
CREAT SERPL-MCNC: 0.99 MG/DL (ref 0.76–1.27)
GLOBULIN SER-MCNC: 3.1 G/DL (ref 1.5–4.5)
GLUCOSE SERPL-MCNC: 143 MG/DL (ref 65–99)
HDLC SERPL-MCNC: 40 MG/DL
LDLC SERPL CALC-MCNC: 80 MG/DL (ref 0–99)
POTASSIUM SERPL-SCNC: 3.9 MMOL/L (ref 3.5–5.2)
PROT SERPL-MCNC: 7.4 G/DL (ref 6–8.5)
SL AMB EGFR AFRICAN AMERICAN: 97 ML/MIN/1.73
SL AMB EGFR NON AFRICAN AMERICAN: 84 ML/MIN/1.73
SL AMB VLDL CHOLESTEROL CALC: 26 MG/DL (ref 5–40)
SODIUM SERPL-SCNC: 140 MMOL/L (ref 134–144)
TRIGL SERPL-MCNC: 151 MG/DL (ref 0–149)

## 2021-02-06 PROBLEM — Z79.899 LONG TERM CURRENT USE OF DIURETIC: Status: RESOLVED | Noted: 2019-07-16 | Resolved: 2021-02-06

## 2021-02-06 PROBLEM — R25.2 LEG CRAMPS: Status: RESOLVED | Noted: 2019-07-16 | Resolved: 2021-02-06

## 2021-02-08 ENCOUNTER — OFFICE VISIT (OUTPATIENT)
Dept: FAMILY MEDICINE CLINIC | Facility: CLINIC | Age: 56
End: 2021-02-08
Payer: COMMERCIAL

## 2021-02-08 VITALS
HEART RATE: 55 BPM | BODY MASS INDEX: 34.65 KG/M2 | TEMPERATURE: 97 F | DIASTOLIC BLOOD PRESSURE: 74 MMHG | SYSTOLIC BLOOD PRESSURE: 126 MMHG | RESPIRATION RATE: 18 BRPM | WEIGHT: 270 LBS | HEIGHT: 74 IN | OXYGEN SATURATION: 98 %

## 2021-02-08 DIAGNOSIS — G89.29 CHRONIC PAIN OF RIGHT KNEE: ICD-10-CM

## 2021-02-08 DIAGNOSIS — G47.33 OSA (OBSTRUCTIVE SLEEP APNEA): ICD-10-CM

## 2021-02-08 DIAGNOSIS — M25.561 CHRONIC PAIN OF RIGHT KNEE: ICD-10-CM

## 2021-02-08 DIAGNOSIS — K43.9 VENTRAL HERNIA WITHOUT OBSTRUCTION OR GANGRENE: ICD-10-CM

## 2021-02-08 DIAGNOSIS — E78.2 HYPERLIPIDEMIA, MIXED: ICD-10-CM

## 2021-02-08 DIAGNOSIS — I10 ESSENTIAL HYPERTENSION: ICD-10-CM

## 2021-02-08 DIAGNOSIS — E11.9 TYPE 2 DIABETES MELLITUS WITHOUT COMPLICATION, WITHOUT LONG-TERM CURRENT USE OF INSULIN (HCC): Primary | ICD-10-CM

## 2021-02-08 DIAGNOSIS — E66.09 CLASS 1 OBESITY DUE TO EXCESS CALORIES WITHOUT SERIOUS COMORBIDITY WITH BODY MASS INDEX (BMI) OF 33.0 TO 33.9 IN ADULT: ICD-10-CM

## 2021-02-08 PROCEDURE — 3074F SYST BP LT 130 MM HG: CPT | Performed by: NURSE PRACTITIONER

## 2021-02-08 PROCEDURE — 99214 OFFICE O/P EST MOD 30 MIN: CPT | Performed by: NURSE PRACTITIONER

## 2021-02-08 PROCEDURE — 3008F BODY MASS INDEX DOCD: CPT | Performed by: NURSE PRACTITIONER

## 2021-02-08 PROCEDURE — 3078F DIAST BP <80 MM HG: CPT | Performed by: NURSE PRACTITIONER

## 2021-02-08 RX ORDER — LANCING DEVICE
EACH MISCELLANEOUS
COMMUNITY
Start: 2020-12-15 | End: 2021-10-13

## 2021-02-08 RX ORDER — GABAPENTIN 300 MG/1
300 CAPSULE ORAL DAILY
Qty: 30 CAPSULE | Refills: 2 | Status: SHIPPED | OUTPATIENT
Start: 2021-02-08 | End: 2021-03-31

## 2021-02-08 RX ORDER — BLOOD SUGAR DIAGNOSTIC
STRIP MISCELLANEOUS
COMMUNITY
Start: 2020-12-15

## 2021-02-08 NOTE — ASSESSMENT & PLAN NOTE
Lab Results   Component Value Date    HGBA1C 7 0 (A) 11/09/2020   Will increase Metformin to 1000 mg bid with meals and will recheck in 4 months

## 2021-02-08 NOTE — PROGRESS NOTES
Assessment/Plan:           Problem List Items Addressed This Visit        Endocrine    Type 2 diabetes mellitus without complication, without long-term current use of insulin ([facility] [address] ) - Primary       Lab Results   Component Value Date    HGBA1C 7 0 (A) 11/09/2020   Will increase Metformin to 1000 mg bid with meals and will recheck in 4 months          Relevant Medications    metFORMIN (GLUCOPHAGE) 500 mg tablet    Other Relevant Orders    Comprehensive metabolic panel    HEMOGLOBIN A1C W/ EAG ESTIMATION    Microalbumin / creatinine urine ratio    CBC and differential       Respiratory    MIRIAN (obstructive sleep apnea)     Patient has a history of MIRIAN and does snore and is not having a machine at home and needs to have an update sleep study          Relevant Orders    Home Study       Cardiovascular and Mediastinum    Essential hypertension     Well controlled on the Losartan HCTZ and Norvasc             Other    Class 1 obesity due to excess calories without serious comorbidity with body mass index (BMI) of 33 0 to 33 9 in adult    Ventral hernia without obstruction or gangrene    Chronic pain of right knee     Patient given referral for orthopedics and will update imaging          Relevant Medications    gabapentin (NEURONTIN) 300 mg capsule    Other Relevant Orders    XR knee 3 vw right non injury    Ambulatory referral to Orthopedic Surgery    Hyperlipidemia, mixed     Lipid panel is well controlled          Relevant Orders    Lipid Panel with Direct LDL reflex    BMI 33 0-33 9,adult            Subjective:      Patient ID: Gio Monsalve is a 60 y o  male  Patient here today for his check up diabetic check up and labs review  Patient reports that many years ago he tore his meniscus on the right knee and saw family medicine years ago about 15 years ago and now having knee pain and hard to bend down and getting back up is very difficult  Patient has not had imaging currently   Patient is interested in pursuing his options for his knee  Patient also has MIRIAN and is not having a machine at this point has not had a sleep study in several years and would like to try to have a sleep study       The following portions of the patient's history were reviewed and updated as appropriate:   He  has a past medical history of Abnormal glucose (05/14/2008), Chest pain (04/23/2008), and Erectile dysfunction  He   Patient Active Problem List    Diagnosis Date Noted    BMI 33 0-33 9,adult 11/09/2020    MIRIAN (obstructive sleep apnea) 12/03/2019    Type 2 diabetes mellitus without complication, without long-term current use of insulin ([facility] [address] ) 02/28/2019    Hyperlipidemia, mixed 02/28/2019    Class 1 obesity due to excess calories without serious comorbidity with body mass index (BMI) of 33 0 to 33 9 in adult 11/05/2018    Ventral hernia without obstruction or gangrene 11/05/2018    Chronic pain of right knee 11/05/2018    Essential hypertension 11/05/2018     He  has a past surgical history that includes No past surgeries  His family history includes No Known Problems in his mother  He  reports that he has never smoked  He has quit using smokeless tobacco  He reports that he does not drink alcohol or use drugs    Current Outpatient Medications   Medication Sig Dispense Refill    amLODIPine (NORVASC) 5 mg tablet Take 1 tablet (5 mg total) by mouth daily 90 tablet 3    atorvastatin (LIPITOR) 10 mg tablet Take 1 tablet (10 mg total) by mouth daily 90 tablet 3    hydrochlorothiazide (HYDRODIURIL) 25 mg tablet Take 1 tablet (25 mg total) by mouth daily 90 tablet 3    losartan (COZAAR) 50 mg tablet Take 1 tablet (50 mg total) by mouth daily 90 tablet 2    meloxicam (MOBIC) 15 mg tablet TAKE 1 TABLET BY MOUTH DAILY 30 tablet 5    metFORMIN (GLUCOPHAGE) 500 mg tablet Take 2 tablets (1,000 mg total) by mouth 2 (two) times a day with meals 360 tablet 3    OneTouch Ultra test strip USE TO TEST THREE TO FOUR TIMES DAILY AS DIRECTED      Pharmacist Choice Lancets MISC USE TO TEST THREE TO FOUR TIMES DAILY AS DIRECTED      gabapentin (NEURONTIN) 300 mg capsule Take 1 capsule (300 mg total) by mouth daily 30 capsule 2     No current facility-administered medications for this visit  He has No Known Allergies       Review of Systems   Constitutional: Negative for activity change, appetite change, chills, diaphoresis, fatigue, fever and unexpected weight change  HENT: Negative for congestion, ear pain, hearing loss, postnasal drip, sinus pressure, sinus pain, sneezing and sore throat  Eyes: Negative for pain, redness and visual disturbance  Respiratory: Negative for cough and shortness of breath  Cardiovascular: Negative for chest pain and leg swelling  Gastrointestinal: Negative for abdominal pain, diarrhea, nausea and vomiting  Endocrine: Negative  Genitourinary: Negative  Musculoskeletal: Positive for arthralgias  Skin: Negative  Allergic/Immunologic: Negative  Neurological: Negative for dizziness and light-headedness  Hematological: Negative  Psychiatric/Behavioral: Negative for behavioral problems and dysphoric mood  Objective:      /74 (BP Location: Left arm, Patient Position: Sitting, Cuff Size: Adult)   Pulse 55   Temp (!) 97 °F (36 1 °C)   Resp 18   Ht 6' 2" (1 88 m)   Wt 122 kg (270 lb)   SpO2 98%   BMI 34 67 kg/m²          Physical Exam  Vitals signs reviewed  Constitutional:       General: He is not in acute distress  Appearance: He is well-developed  HENT:      Head: Normocephalic and atraumatic  Eyes:      Pupils: Pupils are equal, round, and reactive to light  Neck:      Musculoskeletal: Normal range of motion  Thyroid: No thyromegaly  Cardiovascular:      Rate and Rhythm: Normal rate and regular rhythm  Pulses: no weak pulses          Dorsalis pedis pulses are 2+ on the right side and 2+ on the left side          Posterior tibial pulses are 2+ on the right side and 2+ on the left side  Heart sounds: Normal heart sounds  No murmur  Pulmonary:      Effort: Pulmonary effort is normal  No respiratory distress  Breath sounds: Normal breath sounds  No wheezing  Abdominal:      General: Bowel sounds are normal       Palpations: Abdomen is soft  Musculoskeletal: Normal range of motion  Feet:      Right foot:      Skin integrity: No ulcer, skin breakdown, erythema, warmth, callus or dry skin  Left foot:      Skin integrity: No ulcer, skin breakdown, erythema, warmth, callus or dry skin  Skin:     General: Skin is warm and dry  Neurological:      Mental Status: He is alert and oriented to person, place, and time  Right Foot/Ankle   Right Foot Inspection  Skin Exam: skin normal and skin intact no dry skin, no warmth, no callus, no erythema, no maceration, no abnormal color, no pre-ulcer, no ulcer and no callus                          Toe Exam: ROM and strength within normal limits  Sensory   Vibration: intact  Proprioception: intact   Monofilament testing: intact  Vascular  Capillary refills: < 3 seconds  The right DP pulse is 2+  The right PT pulse is 2+  Left Foot/Ankle  Left Foot Inspection  Skin Exam: skin normal and skin intactno dry skin, no warmth, no erythema, no maceration, normal color, no pre-ulcer, no ulcer and no callus                         Toe Exam: ROM and strength within normal limits                   Sensory   Vibration: intact  Proprioception: intact  Monofilament: intact  Vascular  Capillary refills: < 3 seconds  The left DP pulse is 2+  The left PT pulse is 2+  Assign Risk Category:  No deformity present; No loss of protective sensation;  No weak pulses       Risk: 0

## 2021-02-08 NOTE — ASSESSMENT & PLAN NOTE
Patient has a history of MIRIAN and does snore and is not having a machine at home and needs to have an update sleep study

## 2021-03-26 DIAGNOSIS — M25.561 CHRONIC PAIN OF BOTH KNEES: ICD-10-CM

## 2021-03-26 DIAGNOSIS — G89.29 CHRONIC PAIN OF BOTH KNEES: ICD-10-CM

## 2021-03-26 DIAGNOSIS — M25.512 ACUTE PAIN OF BOTH SHOULDERS: ICD-10-CM

## 2021-03-26 DIAGNOSIS — M25.562 CHRONIC PAIN OF BOTH KNEES: ICD-10-CM

## 2021-03-26 DIAGNOSIS — M25.511 ACUTE PAIN OF BOTH SHOULDERS: ICD-10-CM

## 2021-03-26 RX ORDER — MELOXICAM 15 MG/1
TABLET ORAL
Qty: 30 TABLET | Refills: 5 | Status: SHIPPED | OUTPATIENT
Start: 2021-03-26 | End: 2021-10-13

## 2021-03-31 DIAGNOSIS — M25.561 CHRONIC PAIN OF RIGHT KNEE: ICD-10-CM

## 2021-03-31 DIAGNOSIS — G89.29 CHRONIC PAIN OF RIGHT KNEE: ICD-10-CM

## 2021-03-31 RX ORDER — GABAPENTIN 300 MG/1
300 CAPSULE ORAL DAILY
Qty: 30 CAPSULE | Refills: 2 | Status: SHIPPED | OUTPATIENT
Start: 2021-03-31 | End: 2021-05-26

## 2021-04-08 DIAGNOSIS — E11.9 TYPE 2 DIABETES MELLITUS WITHOUT COMPLICATION, WITHOUT LONG-TERM CURRENT USE OF INSULIN (HCC): ICD-10-CM

## 2021-04-08 LAB
ALBUMIN SERPL-MCNC: 4.4 G/DL (ref 3.8–4.9)
ALBUMIN/GLOB SERPL: 1.6 {RATIO} (ref 1.2–2.2)
ALP SERPL-CCNC: 100 IU/L (ref 39–117)
ALT SERPL-CCNC: 55 IU/L (ref 0–44)
AST SERPL-CCNC: 39 IU/L (ref 0–40)
BASOPHILS # BLD AUTO: 0 X10E3/UL (ref 0–0.2)
BASOPHILS NFR BLD AUTO: 1 %
BILIRUB SERPL-MCNC: 0.4 MG/DL (ref 0–1.2)
BUN SERPL-MCNC: 16 MG/DL (ref 6–24)
BUN/CREAT SERPL: 16 (ref 9–20)
CALCIUM SERPL-MCNC: 10.1 MG/DL (ref 8.7–10.2)
CHLORIDE SERPL-SCNC: 101 MMOL/L (ref 96–106)
CHOLEST SERPL-MCNC: 147 MG/DL (ref 100–199)
CO2 SERPL-SCNC: 26 MMOL/L (ref 20–29)
CREAT SERPL-MCNC: 0.97 MG/DL (ref 0.76–1.27)
EOSINOPHIL # BLD AUTO: 0.2 X10E3/UL (ref 0–0.4)
EOSINOPHIL NFR BLD AUTO: 3 %
ERYTHROCYTE [DISTWIDTH] IN BLOOD BY AUTOMATED COUNT: 13.1 % (ref 11.6–15.4)
EST. AVERAGE GLUCOSE BLD GHB EST-MCNC: 171 MG/DL
GLOBULIN SER-MCNC: 2.8 G/DL (ref 1.5–4.5)
GLUCOSE SERPL-MCNC: 178 MG/DL (ref 65–99)
HBA1C MFR BLD: 7.6 % (ref 4.8–5.6)
HCT VFR BLD AUTO: 41.8 % (ref 37.5–51)
HDLC SERPL-MCNC: 39 MG/DL
HGB BLD-MCNC: 14.5 G/DL (ref 13–17.7)
IMM GRANULOCYTES # BLD: 0 X10E3/UL (ref 0–0.1)
IMM GRANULOCYTES NFR BLD: 0 %
LDLC SERPL CALC-MCNC: 82 MG/DL (ref 0–99)
LDLC/HDLC SERPL: 2.1 RATIO (ref 0–3.6)
LYMPHOCYTES # BLD AUTO: 1.8 X10E3/UL (ref 0.7–3.1)
LYMPHOCYTES NFR BLD AUTO: 27 %
MCH RBC QN AUTO: 29 PG (ref 26.6–33)
MCHC RBC AUTO-ENTMCNC: 34.7 G/DL (ref 31.5–35.7)
MCV RBC AUTO: 84 FL (ref 79–97)
MONOCYTES # BLD AUTO: 0.8 X10E3/UL (ref 0.1–0.9)
MONOCYTES NFR BLD AUTO: 12 %
NEUTROPHILS # BLD AUTO: 3.7 X10E3/UL (ref 1.4–7)
NEUTROPHILS NFR BLD AUTO: 57 %
PLATELET # BLD AUTO: 294 X10E3/UL (ref 150–450)
POTASSIUM SERPL-SCNC: 4.2 MMOL/L (ref 3.5–5.2)
PROT SERPL-MCNC: 7.2 G/DL (ref 6–8.5)
RBC # BLD AUTO: 5 X10E6/UL (ref 4.14–5.8)
SL AMB EGFR AFRICAN AMERICAN: 100 ML/MIN/1.73
SL AMB EGFR NON AFRICAN AMERICAN: 86 ML/MIN/1.73
SL AMB VLDL CHOLESTEROL CALC: 26 MG/DL (ref 5–40)
SODIUM SERPL-SCNC: 141 MMOL/L (ref 134–144)
TRIGL SERPL-MCNC: 149 MG/DL (ref 0–149)
WBC # BLD AUTO: 6.5 X10E3/UL (ref 3.4–10.8)

## 2021-04-11 ENCOUNTER — TELEPHONE (OUTPATIENT)
Dept: FAMILY MEDICINE CLINIC | Facility: CLINIC | Age: 56
End: 2021-04-11

## 2021-05-26 DIAGNOSIS — G89.29 CHRONIC PAIN OF RIGHT KNEE: ICD-10-CM

## 2021-05-26 DIAGNOSIS — M25.561 CHRONIC PAIN OF RIGHT KNEE: ICD-10-CM

## 2021-05-26 RX ORDER — GABAPENTIN 300 MG/1
300 CAPSULE ORAL DAILY
Qty: 30 CAPSULE | Refills: 2 | Status: SHIPPED | OUTPATIENT
Start: 2021-05-26 | End: 2021-08-02

## 2021-06-16 ENCOUNTER — OFFICE VISIT (OUTPATIENT)
Dept: FAMILY MEDICINE CLINIC | Facility: CLINIC | Age: 56
End: 2021-06-16
Payer: COMMERCIAL

## 2021-06-16 VITALS
HEART RATE: 88 BPM | RESPIRATION RATE: 18 BRPM | OXYGEN SATURATION: 98 % | DIASTOLIC BLOOD PRESSURE: 72 MMHG | WEIGHT: 269 LBS | SYSTOLIC BLOOD PRESSURE: 130 MMHG | BODY MASS INDEX: 34.52 KG/M2 | TEMPERATURE: 98 F | HEIGHT: 74 IN

## 2021-06-16 DIAGNOSIS — Z12.11 SCREENING FOR COLON CANCER: Primary | ICD-10-CM

## 2021-06-16 DIAGNOSIS — M54.2 NECK PAIN: ICD-10-CM

## 2021-06-16 DIAGNOSIS — S49.91XA INJURY OF RIGHT SHOULDER, INITIAL ENCOUNTER: ICD-10-CM

## 2021-06-16 PROCEDURE — 3075F SYST BP GE 130 - 139MM HG: CPT | Performed by: PHYSICIAN ASSISTANT

## 2021-06-16 PROCEDURE — 3008F BODY MASS INDEX DOCD: CPT | Performed by: PHYSICIAN ASSISTANT

## 2021-06-16 PROCEDURE — 99214 OFFICE O/P EST MOD 30 MIN: CPT | Performed by: PHYSICIAN ASSISTANT

## 2021-06-16 PROCEDURE — 3078F DIAST BP <80 MM HG: CPT | Performed by: PHYSICIAN ASSISTANT

## 2021-06-16 PROCEDURE — 1036F TOBACCO NON-USER: CPT | Performed by: PHYSICIAN ASSISTANT

## 2021-06-16 RX ORDER — CYCLOBENZAPRINE HCL 10 MG
10 TABLET ORAL
Qty: 20 TABLET | Refills: 0 | Status: SHIPPED | OUTPATIENT
Start: 2021-06-16 | End: 2021-12-15 | Stop reason: SDUPTHER

## 2021-06-16 RX ORDER — SITAGLIPTIN 100 MG/1
100 TABLET, FILM COATED ORAL DAILY
COMMUNITY
Start: 2021-05-20 | End: 2021-12-15

## 2021-06-17 NOTE — PROGRESS NOTES
Assessment/Plan:    No problem-specific Assessment & Plan notes found for this encounter  Problem List Items Addressed This Visit     None      Visit Diagnoses     Screening for colon cancer    -  Primary    Relevant Orders    Cologuard    Neck pain        Relevant Medications    cyclobenzaprine (FLEXERIL) 10 mg tablet    Other Relevant Orders    XR spine cervical 2 or 3 vw injury    Injury of right shoulder, initial encounter        Relevant Medications    cyclobenzaprine (FLEXERIL) 10 mg tablet    Other Relevant Orders    XR shoulder 2+ vw right            Subjective:      Patient ID: Monico Smart is a 60 y o  male  58-year-old male presents for follow-up  Patient in need of colon cancer screening  Patient presents today for complaint of right shoulder and neck pain  He states about 4 weeks ago he hit the top of his head compression onto his neck and right shoulder  He states since then he has had significant pain in the right neck and shoulder region  He states that this pain travels down his arm into his hand  States that time she has a shake his hand out because it falls asleep  He has no previous neck injuries  He denies any change in vision  Denies headache or lightheadedness  Denies syncope  Denies chest pain  The following portions of the patient's history were reviewed and updated as appropriate: allergies, current medications, past family history, past medical history, past social history and problem list     Review of Systems   Constitutional: Negative for chills, fatigue and fever  HENT: Negative for congestion, ear pain, sinus pain, sore throat and trouble swallowing  Eyes: Negative for pain, discharge and redness  Respiratory: Negative for cough, chest tightness, shortness of breath and wheezing  Cardiovascular: Negative for chest pain, palpitations and leg swelling  Gastrointestinal: Negative for abdominal pain, diarrhea, nausea and vomiting  Musculoskeletal: Positive for neck pain  Negative for arthralgias, joint swelling and myalgias  Skin: Negative for rash  Neurological: Negative for dizziness, weakness, numbness and headaches  Objective:      /72 (BP Location: Left arm, Patient Position: Sitting)   Pulse 88   Temp 98 °F (36 7 °C)   Resp 18   Ht 6' 2" (1 88 m)   Wt 122 kg (269 lb)   SpO2 98%   BMI 34 54 kg/m²          Physical Exam  Constitutional:       General: He is not in acute distress  Appearance: He is well-developed  Cardiovascular:      Rate and Rhythm: Normal rate and regular rhythm  Heart sounds: Normal heart sounds  Pulmonary:      Effort: Pulmonary effort is normal       Breath sounds: Normal breath sounds  Musculoskeletal:      Right shoulder: Tenderness (ttp supraspinatus) present  Decreased range of motion (decreased shoulder flexion and painful flexion and abduction)  Cervical back: Tenderness present        Thoracic back: Normal       Lumbar back: Normal

## 2021-06-25 DIAGNOSIS — E11.9 TYPE 2 DIABETES MELLITUS WITHOUT COMPLICATION, WITHOUT LONG-TERM CURRENT USE OF INSULIN (HCC): ICD-10-CM

## 2021-06-30 ENCOUNTER — VBI (OUTPATIENT)
Dept: ADMINISTRATIVE | Facility: OTHER | Age: 56
End: 2021-06-30

## 2021-08-02 DIAGNOSIS — E11.9 TYPE 2 DIABETES MELLITUS WITHOUT COMPLICATION, WITHOUT LONG-TERM CURRENT USE OF INSULIN (HCC): ICD-10-CM

## 2021-08-02 DIAGNOSIS — E66.09 CLASS 1 OBESITY DUE TO EXCESS CALORIES WITHOUT SERIOUS COMORBIDITY WITH BODY MASS INDEX (BMI) OF 33.0 TO 33.9 IN ADULT: ICD-10-CM

## 2021-08-02 DIAGNOSIS — G89.29 CHRONIC PAIN OF RIGHT KNEE: ICD-10-CM

## 2021-08-02 DIAGNOSIS — E78.2 HYPERLIPIDEMIA, MIXED: ICD-10-CM

## 2021-08-02 DIAGNOSIS — M25.561 CHRONIC PAIN OF RIGHT KNEE: ICD-10-CM

## 2021-08-02 RX ORDER — GABAPENTIN 300 MG/1
300 CAPSULE ORAL DAILY
Qty: 30 CAPSULE | Refills: 2 | Status: SHIPPED | OUTPATIENT
Start: 2021-08-02 | End: 2021-10-13

## 2021-08-02 RX ORDER — ATORVASTATIN CALCIUM 10 MG/1
10 TABLET, FILM COATED ORAL DAILY
Qty: 90 TABLET | Refills: 3 | Status: SHIPPED | OUTPATIENT
Start: 2021-08-02 | End: 2021-12-12 | Stop reason: SDUPTHER

## 2021-08-03 DIAGNOSIS — I10 ESSENTIAL HYPERTENSION: ICD-10-CM

## 2021-08-03 PROCEDURE — 4010F ACE/ARB THERAPY RXD/TAKEN: CPT | Performed by: PHYSICIAN ASSISTANT

## 2021-08-03 RX ORDER — LOSARTAN POTASSIUM 50 MG/1
50 TABLET ORAL DAILY
Qty: 90 TABLET | Refills: 2 | Status: SHIPPED | OUTPATIENT
Start: 2021-08-03 | End: 2021-12-12 | Stop reason: SDUPTHER

## 2021-08-16 DIAGNOSIS — I10 ESSENTIAL HYPERTENSION: ICD-10-CM

## 2021-08-16 RX ORDER — AMLODIPINE BESYLATE 5 MG/1
5 TABLET ORAL DAILY
Qty: 90 TABLET | Refills: 3 | Status: SHIPPED | OUTPATIENT
Start: 2021-08-16 | End: 2021-12-12 | Stop reason: SDUPTHER

## 2021-10-13 DIAGNOSIS — M25.512 ACUTE PAIN OF BOTH SHOULDERS: ICD-10-CM

## 2021-10-13 DIAGNOSIS — M25.561 CHRONIC PAIN OF BOTH KNEES: ICD-10-CM

## 2021-10-13 DIAGNOSIS — M25.562 CHRONIC PAIN OF BOTH KNEES: ICD-10-CM

## 2021-10-13 DIAGNOSIS — G89.29 CHRONIC PAIN OF RIGHT KNEE: ICD-10-CM

## 2021-10-13 DIAGNOSIS — G89.29 CHRONIC PAIN OF BOTH KNEES: ICD-10-CM

## 2021-10-13 DIAGNOSIS — M25.561 CHRONIC PAIN OF RIGHT KNEE: ICD-10-CM

## 2021-10-13 DIAGNOSIS — E11.9 TYPE 2 DIABETES MELLITUS WITHOUT COMPLICATION, WITHOUT LONG-TERM CURRENT USE OF INSULIN (HCC): Primary | ICD-10-CM

## 2021-10-13 DIAGNOSIS — M25.511 ACUTE PAIN OF BOTH SHOULDERS: ICD-10-CM

## 2021-10-13 RX ORDER — GABAPENTIN 300 MG/1
300 CAPSULE ORAL DAILY
Qty: 30 CAPSULE | Refills: 2 | Status: SHIPPED | OUTPATIENT
Start: 2021-10-13 | End: 2021-10-15 | Stop reason: SDUPTHER

## 2021-10-13 RX ORDER — MELOXICAM 15 MG/1
TABLET ORAL
Qty: 30 TABLET | Refills: 5 | Status: SHIPPED | OUTPATIENT
Start: 2021-10-13 | End: 2021-10-15 | Stop reason: SDUPTHER

## 2021-10-13 RX ORDER — LANCING DEVICE
EACH MISCELLANEOUS
Qty: 100 EACH | Refills: 3 | Status: SHIPPED | OUTPATIENT
Start: 2021-10-13 | End: 2021-10-15 | Stop reason: SDUPTHER

## 2021-10-15 DIAGNOSIS — M25.512 ACUTE PAIN OF BOTH SHOULDERS: ICD-10-CM

## 2021-10-15 DIAGNOSIS — M25.562 CHRONIC PAIN OF BOTH KNEES: ICD-10-CM

## 2021-10-15 DIAGNOSIS — M25.561 CHRONIC PAIN OF BOTH KNEES: ICD-10-CM

## 2021-10-15 DIAGNOSIS — M25.561 CHRONIC PAIN OF RIGHT KNEE: ICD-10-CM

## 2021-10-15 DIAGNOSIS — E11.9 TYPE 2 DIABETES MELLITUS WITHOUT COMPLICATION, WITHOUT LONG-TERM CURRENT USE OF INSULIN (HCC): ICD-10-CM

## 2021-10-15 DIAGNOSIS — G89.29 CHRONIC PAIN OF RIGHT KNEE: ICD-10-CM

## 2021-10-15 DIAGNOSIS — M25.511 ACUTE PAIN OF BOTH SHOULDERS: ICD-10-CM

## 2021-10-15 DIAGNOSIS — G89.29 CHRONIC PAIN OF BOTH KNEES: ICD-10-CM

## 2021-10-15 RX ORDER — MELOXICAM 15 MG/1
15 TABLET ORAL DAILY
Qty: 30 TABLET | Refills: 5 | Status: SHIPPED | OUTPATIENT
Start: 2021-10-15 | End: 2021-12-12 | Stop reason: SDUPTHER

## 2021-10-15 RX ORDER — GABAPENTIN 300 MG/1
300 CAPSULE ORAL DAILY
Qty: 30 CAPSULE | Refills: 2 | Status: SHIPPED | OUTPATIENT
Start: 2021-10-15 | End: 2021-12-12 | Stop reason: SDUPTHER

## 2021-10-15 RX ORDER — LANCING DEVICE
EACH MISCELLANEOUS
Qty: 100 EACH | Refills: 3 | Status: SHIPPED | OUTPATIENT
Start: 2021-10-15

## 2021-10-22 DIAGNOSIS — E11.9 TYPE 2 DIABETES MELLITUS WITHOUT COMPLICATION, WITHOUT LONG-TERM CURRENT USE OF INSULIN (HCC): ICD-10-CM

## 2021-12-12 DIAGNOSIS — I10 ESSENTIAL HYPERTENSION: ICD-10-CM

## 2021-12-12 DIAGNOSIS — E11.9 TYPE 2 DIABETES MELLITUS WITHOUT COMPLICATION, WITHOUT LONG-TERM CURRENT USE OF INSULIN (HCC): ICD-10-CM

## 2021-12-12 DIAGNOSIS — E78.2 HYPERLIPIDEMIA, MIXED: ICD-10-CM

## 2021-12-12 DIAGNOSIS — E66.09 CLASS 1 OBESITY DUE TO EXCESS CALORIES WITHOUT SERIOUS COMORBIDITY WITH BODY MASS INDEX (BMI) OF 33.0 TO 33.9 IN ADULT: ICD-10-CM

## 2021-12-13 RX ORDER — ATORVASTATIN CALCIUM 10 MG/1
10 TABLET, FILM COATED ORAL DAILY
Qty: 90 TABLET | Refills: 0 | Status: SHIPPED | OUTPATIENT
Start: 2021-12-13 | End: 2021-12-15 | Stop reason: SDUPTHER

## 2021-12-13 RX ORDER — HYDROCHLOROTHIAZIDE 25 MG/1
25 TABLET ORAL DAILY
Qty: 90 TABLET | Refills: 0 | Status: SHIPPED | OUTPATIENT
Start: 2021-12-13 | End: 2021-12-15 | Stop reason: SDUPTHER

## 2021-12-13 RX ORDER — AMLODIPINE BESYLATE 5 MG/1
5 TABLET ORAL DAILY
Qty: 90 TABLET | Refills: 0 | Status: SHIPPED | OUTPATIENT
Start: 2021-12-13 | End: 2021-12-15 | Stop reason: SDUPTHER

## 2021-12-13 RX ORDER — LOSARTAN POTASSIUM 50 MG/1
50 TABLET ORAL DAILY
Qty: 90 TABLET | Refills: 0 | Status: SHIPPED | OUTPATIENT
Start: 2021-12-13 | End: 2021-12-15 | Stop reason: SDUPTHER

## 2021-12-15 ENCOUNTER — OFFICE VISIT (OUTPATIENT)
Dept: FAMILY MEDICINE CLINIC | Facility: CLINIC | Age: 56
End: 2021-12-15
Payer: COMMERCIAL

## 2021-12-15 VITALS
TEMPERATURE: 98 F | RESPIRATION RATE: 18 BRPM | BODY MASS INDEX: 33.14 KG/M2 | OXYGEN SATURATION: 98 % | DIASTOLIC BLOOD PRESSURE: 78 MMHG | HEART RATE: 82 BPM | HEIGHT: 74 IN | WEIGHT: 258.25 LBS | SYSTOLIC BLOOD PRESSURE: 132 MMHG

## 2021-12-15 DIAGNOSIS — M54.2 NECK PAIN: ICD-10-CM

## 2021-12-15 DIAGNOSIS — M25.561 CHRONIC PAIN OF RIGHT KNEE: ICD-10-CM

## 2021-12-15 DIAGNOSIS — I10 ESSENTIAL HYPERTENSION: ICD-10-CM

## 2021-12-15 DIAGNOSIS — S49.91XA INJURY OF RIGHT SHOULDER, INITIAL ENCOUNTER: ICD-10-CM

## 2021-12-15 DIAGNOSIS — E66.09 CLASS 1 OBESITY DUE TO EXCESS CALORIES WITHOUT SERIOUS COMORBIDITY WITH BODY MASS INDEX (BMI) OF 33.0 TO 33.9 IN ADULT: ICD-10-CM

## 2021-12-15 DIAGNOSIS — E78.5 HYPERLIPIDEMIA, UNSPECIFIED HYPERLIPIDEMIA TYPE: ICD-10-CM

## 2021-12-15 DIAGNOSIS — Z23 NEED FOR INFLUENZA VACCINATION: ICD-10-CM

## 2021-12-15 DIAGNOSIS — E78.2 HYPERLIPIDEMIA, MIXED: ICD-10-CM

## 2021-12-15 DIAGNOSIS — G89.29 CHRONIC PAIN OF RIGHT KNEE: ICD-10-CM

## 2021-12-15 DIAGNOSIS — Z12.11 COLON CANCER SCREENING: Primary | ICD-10-CM

## 2021-12-15 DIAGNOSIS — E11.9 TYPE 2 DIABETES MELLITUS WITHOUT COMPLICATION, WITHOUT LONG-TERM CURRENT USE OF INSULIN (HCC): ICD-10-CM

## 2021-12-15 LAB — SL AMB POCT HEMOGLOBIN AIC: 7 (ref ?–6.5)

## 2021-12-15 PROCEDURE — 3051F HG A1C>EQUAL 7.0%<8.0%: CPT | Performed by: PHYSICIAN ASSISTANT

## 2021-12-15 PROCEDURE — 4010F ACE/ARB THERAPY RXD/TAKEN: CPT | Performed by: PHYSICIAN ASSISTANT

## 2021-12-15 PROCEDURE — 83036 HEMOGLOBIN GLYCOSYLATED A1C: CPT | Performed by: PHYSICIAN ASSISTANT

## 2021-12-15 PROCEDURE — 3078F DIAST BP <80 MM HG: CPT | Performed by: PHYSICIAN ASSISTANT

## 2021-12-15 PROCEDURE — 99214 OFFICE O/P EST MOD 30 MIN: CPT | Performed by: PHYSICIAN ASSISTANT

## 2021-12-15 PROCEDURE — 3008F BODY MASS INDEX DOCD: CPT | Performed by: PHYSICIAN ASSISTANT

## 2021-12-15 PROCEDURE — 1036F TOBACCO NON-USER: CPT | Performed by: PHYSICIAN ASSISTANT

## 2021-12-15 PROCEDURE — 90682 RIV4 VACC RECOMBINANT DNA IM: CPT | Performed by: PHYSICIAN ASSISTANT

## 2021-12-15 PROCEDURE — 3075F SYST BP GE 130 - 139MM HG: CPT | Performed by: PHYSICIAN ASSISTANT

## 2021-12-15 PROCEDURE — 90471 IMMUNIZATION ADMIN: CPT | Performed by: PHYSICIAN ASSISTANT

## 2021-12-15 RX ORDER — GABAPENTIN 300 MG/1
300 CAPSULE ORAL DAILY
Qty: 90 CAPSULE | Refills: 0 | Status: SHIPPED | OUTPATIENT
Start: 2021-12-15 | End: 2022-04-11 | Stop reason: SDUPTHER

## 2021-12-15 RX ORDER — AMLODIPINE BESYLATE 5 MG/1
5 TABLET ORAL DAILY
Qty: 90 TABLET | Refills: 0 | Status: SHIPPED | OUTPATIENT
Start: 2021-12-15 | End: 2022-04-11 | Stop reason: SDUPTHER

## 2021-12-15 RX ORDER — LOSARTAN POTASSIUM 50 MG/1
50 TABLET ORAL DAILY
Qty: 90 TABLET | Refills: 0 | Status: SHIPPED | OUTPATIENT
Start: 2021-12-15 | End: 2022-04-11 | Stop reason: SDUPTHER

## 2021-12-15 RX ORDER — CYCLOBENZAPRINE HCL 10 MG
10 TABLET ORAL
Qty: 20 TABLET | Refills: 0 | Status: SHIPPED | OUTPATIENT
Start: 2021-12-15 | End: 2022-04-11 | Stop reason: ALTCHOICE

## 2021-12-15 RX ORDER — ATORVASTATIN CALCIUM 10 MG/1
10 TABLET, FILM COATED ORAL DAILY
Qty: 90 TABLET | Refills: 0 | Status: SHIPPED | OUTPATIENT
Start: 2021-12-15 | End: 2022-04-11 | Stop reason: SDUPTHER

## 2021-12-15 RX ORDER — HYDROCHLOROTHIAZIDE 25 MG/1
25 TABLET ORAL DAILY
Qty: 90 TABLET | Refills: 0 | Status: SHIPPED | OUTPATIENT
Start: 2021-12-15 | End: 2022-04-11 | Stop reason: SDUPTHER

## 2021-12-17 LAB
LEFT EYE DIABETIC RETINOPATHY: NORMAL
RIGHT EYE DIABETIC RETINOPATHY: NORMAL
SEVERITY (EYE EXAM): NORMAL

## 2022-01-04 DIAGNOSIS — Z11.59 SCREENING FOR VIRAL DISEASE: Primary | ICD-10-CM

## 2022-01-04 PROCEDURE — U0005 INFEC AGEN DETEC AMPLI PROBE: HCPCS | Performed by: NURSE PRACTITIONER

## 2022-01-04 PROCEDURE — U0003 INFECTIOUS AGENT DETECTION BY NUCLEIC ACID (DNA OR RNA); SEVERE ACUTE RESPIRATORY SYNDROME CORONAVIRUS 2 (SARS-COV-2) (CORONAVIRUS DISEASE [COVID-19]), AMPLIFIED PROBE TECHNIQUE, MAKING USE OF HIGH THROUGHPUT TECHNOLOGIES AS DESCRIBED BY CMS-2020-01-R: HCPCS | Performed by: NURSE PRACTITIONER

## 2022-01-10 ENCOUNTER — NURSE TRIAGE (OUTPATIENT)
Dept: OTHER | Facility: OTHER | Age: 57
End: 2022-01-10

## 2022-01-10 NOTE — TELEPHONE ENCOUNTER
Please call and see how he is doing and if he went to the hospital to get checked out with that low of a blood pressure

## 2022-01-10 NOTE — TELEPHONE ENCOUNTER
Reason for Disposition   [1] Systolic BP  AND [5] taking blood pressure medications AND [3] NOT dizzy, lightheaded or weak    Answer Assessment - Initial Assessment Questions  1  BLOOD PRESSURE: "What is the blood pressure?" "Did you take at least two measurements 5 minutes apart?"      101/69 , was 70/40 @1900, 97/54 @ 0100 am , 84/49 @ 0300   2  ONSET: "When did you take your blood pressure?"      Last Evening   3  HOW: "How did you obtain the blood pressure?" (e g , visiting nurse, automatic home BP monitor)      Home machine  4  HISTORY: "Do you have a history of low blood pressure?" "What is your blood pressure normally?"      No High  5  MEDICATIONS: "Are you taking any medications for blood pressure?" If Yes, ask: "Have they been changed recently?"      yes  6  PULSE RATE: "Do you know what your pulse rate is?"       n/a  7  OTHER SYMPTOMS: "Have you been sick recently?" "Have you had a recent injury?"      Has S/S of Covid ,Wife Tested +  8   PREGNANCY: "Is there any chance you are pregnant?" "When was your last menstrual period?"      N/A    Protocols used: BLOOD PRESSURE - LOW-ADULT-

## 2022-01-10 NOTE — TELEPHONE ENCOUNTER
Regarding: Blood Pressure 84/49  ----- Message from Lestine Scheuermann sent at 1/10/2022  4:01 AM EST -----  " His Blood Pressure is 84/49 "

## 2022-01-14 NOTE — TELEPHONE ENCOUNTER
Left a message to have carolina call and update us on his condition, closing task we left multiple messages

## 2022-04-11 ENCOUNTER — OFFICE VISIT (OUTPATIENT)
Dept: FAMILY MEDICINE CLINIC | Facility: CLINIC | Age: 57
End: 2022-04-11
Payer: COMMERCIAL

## 2022-04-11 VITALS
TEMPERATURE: 98.2 F | OXYGEN SATURATION: 95 % | HEIGHT: 74 IN | DIASTOLIC BLOOD PRESSURE: 72 MMHG | BODY MASS INDEX: 33.11 KG/M2 | HEART RATE: 74 BPM | WEIGHT: 258 LBS | SYSTOLIC BLOOD PRESSURE: 120 MMHG

## 2022-04-11 DIAGNOSIS — G47.33 OSA (OBSTRUCTIVE SLEEP APNEA): ICD-10-CM

## 2022-04-11 DIAGNOSIS — E78.2 HYPERLIPIDEMIA, MIXED: ICD-10-CM

## 2022-04-11 DIAGNOSIS — E11.9 TYPE 2 DIABETES MELLITUS WITHOUT COMPLICATION, WITHOUT LONG-TERM CURRENT USE OF INSULIN (HCC): Primary | ICD-10-CM

## 2022-04-11 DIAGNOSIS — M25.561 CHRONIC PAIN OF BOTH KNEES: ICD-10-CM

## 2022-04-11 DIAGNOSIS — G89.29 CHRONIC PAIN OF RIGHT KNEE: ICD-10-CM

## 2022-04-11 DIAGNOSIS — S05.01XA ABRASION OF RIGHT CORNEA, INITIAL ENCOUNTER: ICD-10-CM

## 2022-04-11 DIAGNOSIS — M25.561 CHRONIC PAIN OF RIGHT KNEE: ICD-10-CM

## 2022-04-11 DIAGNOSIS — M54.2 NECK PAIN: ICD-10-CM

## 2022-04-11 DIAGNOSIS — G89.29 CHRONIC PAIN OF BOTH KNEES: ICD-10-CM

## 2022-04-11 DIAGNOSIS — E66.09 CLASS 1 OBESITY DUE TO EXCESS CALORIES WITHOUT SERIOUS COMORBIDITY WITH BODY MASS INDEX (BMI) OF 33.0 TO 33.9 IN ADULT: ICD-10-CM

## 2022-04-11 DIAGNOSIS — Z12.5 SCREENING FOR PROSTATE CANCER: ICD-10-CM

## 2022-04-11 DIAGNOSIS — K43.9 VENTRAL HERNIA WITHOUT OBSTRUCTION OR GANGRENE: ICD-10-CM

## 2022-04-11 DIAGNOSIS — S49.91XA INJURY OF RIGHT SHOULDER, INITIAL ENCOUNTER: ICD-10-CM

## 2022-04-11 DIAGNOSIS — I10 ESSENTIAL HYPERTENSION: ICD-10-CM

## 2022-04-11 DIAGNOSIS — M25.562 CHRONIC PAIN OF BOTH KNEES: ICD-10-CM

## 2022-04-11 DIAGNOSIS — M25.511 ACUTE PAIN OF BOTH SHOULDERS: ICD-10-CM

## 2022-04-11 DIAGNOSIS — M25.512 ACUTE PAIN OF BOTH SHOULDERS: ICD-10-CM

## 2022-04-11 LAB — SL AMB POCT HEMOGLOBIN AIC: 7.1 (ref ?–6.5)

## 2022-04-11 PROCEDURE — 3074F SYST BP LT 130 MM HG: CPT | Performed by: NURSE PRACTITIONER

## 2022-04-11 PROCEDURE — 3051F HG A1C>EQUAL 7.0%<8.0%: CPT | Performed by: NURSE PRACTITIONER

## 2022-04-11 PROCEDURE — 4010F ACE/ARB THERAPY RXD/TAKEN: CPT | Performed by: NURSE PRACTITIONER

## 2022-04-11 PROCEDURE — 3725F SCREEN DEPRESSION PERFORMED: CPT | Performed by: NURSE PRACTITIONER

## 2022-04-11 PROCEDURE — 3008F BODY MASS INDEX DOCD: CPT | Performed by: NURSE PRACTITIONER

## 2022-04-11 PROCEDURE — 99214 OFFICE O/P EST MOD 30 MIN: CPT | Performed by: NURSE PRACTITIONER

## 2022-04-11 PROCEDURE — 3078F DIAST BP <80 MM HG: CPT | Performed by: NURSE PRACTITIONER

## 2022-04-11 PROCEDURE — 83036 HEMOGLOBIN GLYCOSYLATED A1C: CPT | Performed by: NURSE PRACTITIONER

## 2022-04-11 RX ORDER — GABAPENTIN 300 MG/1
300 CAPSULE ORAL DAILY
Qty: 90 CAPSULE | Refills: 3 | Status: SHIPPED | OUTPATIENT
Start: 2022-04-11 | End: 2022-07-10

## 2022-04-11 RX ORDER — HYDROCHLOROTHIAZIDE 25 MG/1
25 TABLET ORAL DAILY
Qty: 90 TABLET | Refills: 3 | Status: SHIPPED | OUTPATIENT
Start: 2022-04-11

## 2022-04-11 RX ORDER — AMLODIPINE BESYLATE 5 MG/1
5 TABLET ORAL DAILY
Qty: 90 TABLET | Refills: 3 | Status: SHIPPED | OUTPATIENT
Start: 2022-04-11 | End: 2022-07-10

## 2022-04-11 RX ORDER — POLYMYXIN B SULFATE AND TRIMETHOPRIM 1; 10000 MG/ML; [USP'U]/ML
1 SOLUTION OPHTHALMIC EVERY 4 HOURS
Qty: 10 ML | Refills: 0 | Status: SHIPPED | OUTPATIENT
Start: 2022-04-11

## 2022-04-11 RX ORDER — MELOXICAM 15 MG/1
15 TABLET ORAL DAILY
Qty: 90 TABLET | Refills: 3 | Status: SHIPPED | OUTPATIENT
Start: 2022-04-11 | End: 2022-07-10

## 2022-04-11 RX ORDER — ATORVASTATIN CALCIUM 10 MG/1
10 TABLET, FILM COATED ORAL DAILY
Qty: 90 TABLET | Refills: 3 | Status: SHIPPED | OUTPATIENT
Start: 2022-04-11 | End: 2022-07-10

## 2022-04-11 RX ORDER — PROPARACAINE HYDROCHLORIDE 5 MG/ML
1 SOLUTION/ DROPS OPHTHALMIC EVERY 4 HOURS PRN
Qty: 15 ML | Refills: 0 | Status: SHIPPED | OUTPATIENT
Start: 2022-04-11

## 2022-04-11 RX ORDER — LOSARTAN POTASSIUM 50 MG/1
50 TABLET ORAL DAILY
Qty: 90 TABLET | Refills: 3 | Status: SHIPPED | OUTPATIENT
Start: 2022-04-11

## 2022-04-11 NOTE — PROGRESS NOTES
Assessment/Plan:           Problem List Items Addressed This Visit        Endocrine    Type 2 diabetes mellitus without complication, without long-term current use of insulin ([facility] [address] ) - Primary       Lab Results   Component Value Date    HGBA1C 7 1 (A) 04/11/2022   Patient is taking the Metformin 500 mg bid         Relevant Medications    metFORMIN (GLUCOPHAGE) 500 mg tablet    atorvastatin (LIPITOR) 10 mg tablet    Other Relevant Orders    POCT hemoglobin A1c (Completed)    Comprehensive metabolic panel    CBC and differential    Lipid Panel with Direct LDL reflex    Microalbumin / creatinine urine ratio       Respiratory    MIRIAN (obstructive sleep apnea)       Cardiovascular and Mediastinum    Essential hypertension     Patient is doing well on the losartan, HCTZ, and Amlodipine          Relevant Medications    amLODIPine (NORVASC) 5 mg tablet    hydrochlorothiazide (HYDRODIURIL) 25 mg tablet    losartan (COZAAR) 50 mg tablet       Other    Class 1 obesity due to excess calories without serious comorbidity with body mass index (BMI) of 33 0 to 33 9 in adult    Relevant Medications    atorvastatin (LIPITOR) 10 mg tablet    Ventral hernia without obstruction or gangrene    Chronic pain of right knee    Relevant Medications    gabapentin (NEURONTIN) 300 mg capsule    meloxicam (MOBIC) 15 mg tablet    Hyperlipidemia, mixed    Relevant Medications    atorvastatin (LIPITOR) 10 mg tablet    BMI 33 0-33 9,adult      Other Visit Diagnoses     Abrasion of right cornea, initial encounter        Relevant Medications    polymyxin b-trimethoprim (POLYTRIM) ophthalmic solution    proparacaine (ALCAINE) 0 5 % ophthalmic solution    Screening for prostate cancer        Relevant Orders    PSA, Total Screen    Neck pain        Injury of right shoulder, initial encounter        Chronic pain of both knees        Relevant Medications    meloxicam (MOBIC) 15 mg tablet    Acute pain of both shoulders        Relevant Medications    meloxicam (MOBIC) 15 mg tablet            Subjective:      Patient ID: Lacie Beyer is a 60 y o  male  Patient here tonight with complaints that he is having some grinding metal and was also putting in lights and started having pain in the right eye and having constant tearing in the right eye and sensitive to light and he is able to see out of the eye and is having clear drainage from the eye and tried putting drops in the eye and is having a pain in the eye  Left eye is feeling fine  Patient is also having left eye twitching and blinking and has happened to him in the past     BMI Counseling: Body mass index is 33 13 kg/m²  The BMI is above normal  Nutrition recommendations include decreasing portion sizes, encouraging healthy choices of fruits and vegetables, decreasing fast food intake, consuming healthier snacks, limiting drinks that contain sugar, moderation in carbohydrate intake, increasing intake of lean protein, reducing intake of saturated and trans fat and reducing intake of cholesterol  Exercise recommendations include moderate physical activity 150 minutes/week  No pharmacotherapy was ordered  Patient referred to PCP  Rationale for BMI follow-up plan is due to patient being overweight or obese  The following portions of the patient's history were reviewed and updated as appropriate:   He  has a past medical history of Abnormal glucose (05/14/2008), Chest pain (04/23/2008), and Erectile dysfunction    He   Patient Active Problem List    Diagnosis Date Noted    BMI 33 0-33 9,adult 11/09/2020    MIRIAN (obstructive sleep apnea) 12/03/2019    Type 2 diabetes mellitus without complication, without long-term current use of insulin ([facility] [address] ) 02/28/2019    Hyperlipidemia, mixed 02/28/2019    Class 1 obesity due to excess calories without serious comorbidity with body mass index (BMI) of 33 0 to 33 9 in adult 11/05/2018    Ventral hernia without obstruction or gangrene 11/05/2018    Chronic pain of right knee 11/05/2018    Essential hypertension 11/05/2018     He  has a past surgical history that includes No past surgeries  His family history includes No Known Problems in his mother  He  reports that he has never smoked  He has quit using smokeless tobacco  He reports that he does not drink alcohol and does not use drugs  Current Outpatient Medications   Medication Sig Dispense Refill    amLODIPine (NORVASC) 5 mg tablet Take 1 tablet (5 mg total) by mouth daily 90 tablet 3    atorvastatin (LIPITOR) 10 mg tablet Take 1 tablet (10 mg total) by mouth daily 90 tablet 3    gabapentin (NEURONTIN) 300 mg capsule Take 1 capsule (300 mg total) by mouth daily 90 capsule 3    hydrochlorothiazide (HYDRODIURIL) 25 mg tablet Take 1 tablet (25 mg total) by mouth daily 90 tablet 3    losartan (COZAAR) 50 mg tablet Take 1 tablet (50 mg total) by mouth daily 90 tablet 3    meloxicam (MOBIC) 15 mg tablet Take 1 tablet (15 mg total) by mouth daily 90 tablet 3    metFORMIN (GLUCOPHAGE) 500 mg tablet Take 1 tablet (500 mg total) by mouth 2 (two) times a day with meals 90 tablet 3    OneTouch Ultra test strip USE TO TEST THREE TO FOUR TIMES DAILY AS DIRECTED      Pharmacist Choice Lancets MISC Use to test three to four times daily 100 each 3    polymyxin b-trimethoprim (POLYTRIM) ophthalmic solution Administer 1 drop to the right eye every 4 (four) hours 10 mL 0    proparacaine (ALCAINE) 0 5 % ophthalmic solution Administer 1 drop to the right eye every 4 (four) hours as needed (eye pain) 15 mL 0     No current facility-administered medications for this visit  He has No Known Allergies       Review of Systems   Constitutional: Negative for activity change, appetite change, chills, diaphoresis, fatigue, fever and unexpected weight change  HENT: Negative for congestion, ear pain, hearing loss, postnasal drip, sinus pressure, sinus pain, sneezing and sore throat  Eyes: Positive for photophobia and redness   Negative for pain and visual disturbance  Respiratory: Negative for cough and shortness of breath  Cardiovascular: Negative for chest pain and leg swelling  Gastrointestinal: Negative for abdominal pain, diarrhea, nausea and vomiting  Endocrine: Negative  Genitourinary: Negative  Musculoskeletal: Negative for arthralgias  Skin: Negative  Neurological: Negative for dizziness, tremors, seizures, syncope, facial asymmetry, speech difficulty, weakness, light-headedness, numbness and headaches  Psychiatric/Behavioral: Negative for behavioral problems and dysphoric mood  Objective:      /72   Pulse 74   Temp 98 2 °F (36 8 °C)   Ht 6' 2" (1 88 m)   Wt 117 kg (258 lb)   SpO2 95%   BMI 33 13 kg/m²          Physical Exam  Vitals and nursing note reviewed  Constitutional:       General: He is not in acute distress  Appearance: He is well-developed  HENT:      Head: Normocephalic and atraumatic  Right Ear: Tympanic membrane normal       Left Ear: Tympanic membrane normal       Nose: Nose normal    Eyes:      Pupils: Pupils are equal, round, and reactive to light  Right eye: Corneal abrasion and fluorescein uptake present  Slit lamp exam:     Right eye: No corneal ulcer or foreign body  Neck:      Thyroid: No thyromegaly  Cardiovascular:      Rate and Rhythm: Normal rate and regular rhythm  Pulses: no weak pulses          Dorsalis pedis pulses are 2+ on the right side and 2+ on the left side  Posterior tibial pulses are 2+ on the right side and 2+ on the left side  Heart sounds: Normal heart sounds  No murmur heard  Pulmonary:      Effort: Pulmonary effort is normal  No respiratory distress  Breath sounds: Normal breath sounds  No wheezing  Abdominal:      General: Bowel sounds are normal       Palpations: Abdomen is soft  Musculoskeletal:         General: Normal range of motion  Cervical back: Normal range of motion     Feet:      Right foot:      Skin integrity: No ulcer, skin breakdown, erythema, warmth, callus or dry skin  Left foot:      Skin integrity: No ulcer, skin breakdown, erythema, warmth, callus or dry skin  Skin:     General: Skin is warm and dry  Neurological:      Mental Status: He is alert and oriented to person, place, and time  Procedures  Patient's shoes and socks removed  Right Foot/Ankle   Right Foot Inspection  Skin Exam: skin normal and skin intact  No dry skin, no warmth, no callus, no erythema, no maceration, no abnormal color, no pre-ulcer, no ulcer and no callus  Toe Exam: ROM and strength within normal limits  Sensory   Vibration: intact  Proprioception: intact  Monofilament testing: intact    Vascular  Capillary refills: < 3 seconds  The right DP pulse is 2+  The right PT pulse is 2+  Left Foot/Ankle  Left Foot Inspection  Skin Exam: skin normal and skin intact  No dry skin, no warmth, no erythema, no maceration, normal color, no pre-ulcer, no ulcer and no callus  Toe Exam: ROM and strength within normal limits  Sensory   Vibration: intact  Proprioception: intact  Monofilament testing: intact    Vascular  Capillary refills: < 3 seconds  The left DP pulse is 2+  The left PT pulse is 2+       Assign Risk Category  No deformity present  No loss of protective sensation  No weak pulses  Risk: 0

## 2022-04-14 ENCOUNTER — TELEPHONE (OUTPATIENT)
Dept: FAMILY MEDICINE CLINIC | Facility: CLINIC | Age: 57
End: 2022-04-14

## 2022-04-25 ENCOUNTER — VBI (OUTPATIENT)
Dept: ADMINISTRATIVE | Facility: OTHER | Age: 57
End: 2022-04-25

## 2022-04-25 NOTE — TELEPHONE ENCOUNTER
Eye Exam from [facility] Hollins =     [facility] - Dilated not checked   [facility] does retinal imaging exams - then patient goes into a room and has a telehealth appt with Dr  = asked [facility] to send me imaging      I checked with BRO Bradley and Yobani Rush - to enter - They advised me that I can result the eye exam

## 2022-04-27 NOTE — TELEPHONE ENCOUNTER
Upon review of the In Basket request we were able to locate, review, and update the patient chart as requested for Diabetic Eye Exam     Any additional questions or concerns should be emailed to the Practice Liaisons via [email or web address] email, please do not reply via In Basket      Thank you  Moncho Berg MA

## 2022-05-11 ENCOUNTER — VBI (OUTPATIENT)
Dept: ADMINISTRATIVE | Facility: OTHER | Age: 57
End: 2022-05-11

## 2022-06-07 LAB
ALBUMIN SERPL-MCNC: 4.6 G/DL (ref 3.8–4.9)
ALBUMIN/GLOB SERPL: 1.8 {RATIO} (ref 1.2–2.2)
ALP SERPL-CCNC: 105 IU/L (ref 44–121)
ALT SERPL-CCNC: 30 IU/L (ref 0–44)
AST SERPL-CCNC: 21 IU/L (ref 0–40)
BASOPHILS # BLD AUTO: 0 X10E3/UL (ref 0–0.2)
BASOPHILS NFR BLD AUTO: 1 %
BILIRUB SERPL-MCNC: 0.5 MG/DL (ref 0–1.2)
BUN SERPL-MCNC: 15 MG/DL (ref 6–24)
BUN/CREAT SERPL: 17 (ref 9–20)
CALCIUM SERPL-MCNC: 9.9 MG/DL (ref 8.7–10.2)
CHLORIDE SERPL-SCNC: 104 MMOL/L (ref 96–106)
CHOLEST SERPL-MCNC: 138 MG/DL (ref 100–199)
CO2 SERPL-SCNC: 24 MMOL/L (ref 20–29)
CREAT SERPL-MCNC: 0.86 MG/DL (ref 0.76–1.27)
EGFR: 100 ML/MIN/1.73
EOSINOPHIL # BLD AUTO: 0.1 X10E3/UL (ref 0–0.4)
EOSINOPHIL NFR BLD AUTO: 2 %
ERYTHROCYTE [DISTWIDTH] IN BLOOD BY AUTOMATED COUNT: 13.1 % (ref 11.6–15.4)
GLOBULIN SER-MCNC: 2.5 G/DL (ref 1.5–4.5)
GLUCOSE SERPL-MCNC: 136 MG/DL (ref 65–99)
HCT VFR BLD AUTO: 43 % (ref 37.5–51)
HDLC SERPL-MCNC: 38 MG/DL
HGB BLD-MCNC: 14.5 G/DL (ref 13–17.7)
IMM GRANULOCYTES # BLD: 0 X10E3/UL (ref 0–0.1)
IMM GRANULOCYTES NFR BLD: 0 %
LDLC SERPL CALC-MCNC: 84 MG/DL (ref 0–99)
LYMPHOCYTES # BLD AUTO: 2.1 X10E3/UL (ref 0.7–3.1)
LYMPHOCYTES NFR BLD AUTO: 33 %
MCH RBC QN AUTO: 29.1 PG (ref 26.6–33)
MCHC RBC AUTO-ENTMCNC: 33.7 G/DL (ref 31.5–35.7)
MCV RBC AUTO: 86 FL (ref 79–97)
MONOCYTES # BLD AUTO: 0.8 X10E3/UL (ref 0.1–0.9)
MONOCYTES NFR BLD AUTO: 12 %
NEUTROPHILS # BLD AUTO: 3.4 X10E3/UL (ref 1.4–7)
NEUTROPHILS NFR BLD AUTO: 52 %
PLATELET # BLD AUTO: 268 X10E3/UL (ref 150–450)
POTASSIUM SERPL-SCNC: 4.1 MMOL/L (ref 3.5–5.2)
PROT SERPL-MCNC: 7.1 G/DL (ref 6–8.5)
PSA SERPL-MCNC: 1 NG/ML (ref 0–4)
RBC # BLD AUTO: 4.99 X10E6/UL (ref 4.14–5.8)
SL AMB REFLEX CRITERIA: NORMAL
SL AMB VLDL CHOLESTEROL CALC: 16 MG/DL (ref 5–40)
SODIUM SERPL-SCNC: 142 MMOL/L (ref 134–144)
TRIGL SERPL-MCNC: 85 MG/DL (ref 0–149)
WBC # BLD AUTO: 6.5 X10E3/UL (ref 3.4–10.8)

## 2022-08-11 ENCOUNTER — APPOINTMENT (OUTPATIENT)
Dept: RADIOLOGY | Facility: CLINIC | Age: 57
End: 2022-08-11
Payer: COMMERCIAL

## 2022-08-11 ENCOUNTER — OFFICE VISIT (OUTPATIENT)
Dept: FAMILY MEDICINE CLINIC | Facility: CLINIC | Age: 57
End: 2022-08-11
Payer: COMMERCIAL

## 2022-08-11 VITALS
WEIGHT: 242 LBS | HEART RATE: 58 BPM | OXYGEN SATURATION: 98 % | TEMPERATURE: 99.6 F | HEIGHT: 74 IN | DIASTOLIC BLOOD PRESSURE: 78 MMHG | BODY MASS INDEX: 31.06 KG/M2 | SYSTOLIC BLOOD PRESSURE: 110 MMHG

## 2022-08-11 DIAGNOSIS — S80.851A: Primary | ICD-10-CM

## 2022-08-11 DIAGNOSIS — L08.9: Primary | ICD-10-CM

## 2022-08-11 DIAGNOSIS — S80.851A: ICD-10-CM

## 2022-08-11 DIAGNOSIS — L08.9: ICD-10-CM

## 2022-08-11 PROCEDURE — 99214 OFFICE O/P EST MOD 30 MIN: CPT | Performed by: PHYSICIAN ASSISTANT

## 2022-08-11 PROCEDURE — 3074F SYST BP LT 130 MM HG: CPT | Performed by: PHYSICIAN ASSISTANT

## 2022-08-11 PROCEDURE — 73590 X-RAY EXAM OF LOWER LEG: CPT

## 2022-08-11 PROCEDURE — 3078F DIAST BP <80 MM HG: CPT | Performed by: PHYSICIAN ASSISTANT

## 2022-08-11 RX ORDER — CEPHALEXIN 500 MG/1
500 CAPSULE ORAL EVERY 6 HOURS SCHEDULED
Qty: 20 CAPSULE | Refills: 0 | Status: SHIPPED | OUTPATIENT
Start: 2022-08-11 | End: 2022-08-16

## 2022-08-11 NOTE — PROGRESS NOTES
Assessment/Plan:       Problem List Items Addressed This Visit    None     Visit Diagnoses     Foreign body of hip or leg, superficial, infected, right, initial encounter    -  Primary    Relevant Medications    cephalexin (KEFLEX) 500 mg capsule    Other Relevant Orders    XR tibia fibula 2 vw right        exam with a small central wound with scabbing, mild surrounding erythema, warmth, TTP  Pt concerned about FB, no clear finding on exam  Will XR  Cover with keflex, keep area clean  Return precautions given  TDAP UTD    Subjective:      Patient ID: Teresa Gutierres is a 60 y o  male  Pt presents after foreign body to right leg    Happened about 3 weeks, unsure what is in his leg but something flew up when he was mowing his lawn  Denies fever or chills, no CP, SOB  Does have some pain in the right leg, the skin feels like its stretched  Pt is diabetic, last a1c 7 1      The following portions of the patient's history were reviewed and updated as appropriate:   He  has a past medical history of Abnormal glucose (05/14/2008), Chest pain (04/23/2008), and Erectile dysfunction  He   Patient Active Problem List    Diagnosis Date Noted    BMI 33 0-33 9,adult 11/09/2020    MIRIAN (obstructive sleep apnea) 12/03/2019    Type 2 diabetes mellitus without complication, without long-term current use of insulin ([facility] [address] ) 02/28/2019    Hyperlipidemia, mixed 02/28/2019    Class 1 obesity due to excess calories without serious comorbidity with body mass index (BMI) of 33 0 to 33 9 in adult 11/05/2018    Ventral hernia without obstruction or gangrene 11/05/2018    Chronic pain of right knee 11/05/2018    Essential hypertension 11/05/2018     He  has a past surgical history that includes No past surgeries  His family history includes No Known Problems in his mother  He  reports that he has never smoked  He has quit using smokeless tobacco  He reports that he does not drink alcohol and does not use drugs    Current Outpatient Medications   Medication Sig Dispense Refill    amLODIPine (NORVASC) 5 mg tablet Take 1 tablet (5 mg total) by mouth daily 90 tablet 3    atorvastatin (LIPITOR) 10 mg tablet Take 1 tablet (10 mg total) by mouth daily 90 tablet 3    cephalexin (KEFLEX) 500 mg capsule Take 1 capsule (500 mg total) by mouth every 6 (six) hours for 5 days 20 capsule 0    gabapentin (NEURONTIN) 300 mg capsule Take 1 capsule (300 mg total) by mouth daily 90 capsule 3    hydrochlorothiazide (HYDRODIURIL) 25 mg tablet Take 1 tablet (25 mg total) by mouth daily 90 tablet 3    losartan (COZAAR) 50 mg tablet Take 1 tablet (50 mg total) by mouth daily 90 tablet 3    meloxicam (MOBIC) 15 mg tablet Take 1 tablet (15 mg total) by mouth daily 90 tablet 3    metFORMIN (GLUCOPHAGE) 500 mg tablet Take 1 tablet (500 mg total) by mouth 2 (two) times a day with meals 90 tablet 3    OneTouch Ultra test strip USE TO TEST THREE TO FOUR TIMES DAILY AS DIRECTED      Pharmacist Choice Lancets MISC Use to test three to four times daily 100 each 3     No current facility-administered medications for this visit       Current Outpatient Medications on File Prior to Visit   Medication Sig    amLODIPine (NORVASC) 5 mg tablet Take 1 tablet (5 mg total) by mouth daily    atorvastatin (LIPITOR) 10 mg tablet Take 1 tablet (10 mg total) by mouth daily    gabapentin (NEURONTIN) 300 mg capsule Take 1 capsule (300 mg total) by mouth daily    hydrochlorothiazide (HYDRODIURIL) 25 mg tablet Take 1 tablet (25 mg total) by mouth daily    losartan (COZAAR) 50 mg tablet Take 1 tablet (50 mg total) by mouth daily    meloxicam (MOBIC) 15 mg tablet Take 1 tablet (15 mg total) by mouth daily    metFORMIN (GLUCOPHAGE) 500 mg tablet Take 1 tablet (500 mg total) by mouth 2 (two) times a day with meals    OneTouch Ultra test strip USE TO TEST THREE TO FOUR TIMES DAILY AS DIRECTED    Pharmacist Choice Lancets MISC Use to test three to four times daily     No current facility-administered medications on file prior to visit  He has No Known Allergies       Review of Systems   Constitutional: Negative for chills, fatigue and fever  HENT: Negative for congestion, ear pain, hearing loss, nosebleeds, postnasal drip, rhinorrhea, sinus pressure, sinus pain, sneezing and sore throat  Eyes: Negative for pain, discharge, itching and visual disturbance  Respiratory: Negative for cough, chest tightness, shortness of breath and wheezing  Cardiovascular: Negative for chest pain, palpitations and leg swelling  Gastrointestinal: Negative for abdominal pain, blood in stool, constipation, diarrhea, nausea and vomiting  Genitourinary: Negative for frequency and urgency  Skin: Positive for color change and wound  Neurological: Negative for dizziness, light-headedness and numbness  Objective:      /78 (BP Location: Left arm, Patient Position: Sitting, Cuff Size: Large)   Pulse 58   Temp 99 6 °F (37 6 °C)   Ht 6' 2" (1 88 m)   Wt 110 kg (242 lb)   SpO2 98%   BMI 31 07 kg/m²          Physical Exam  Vitals and nursing note reviewed  Constitutional:       Appearance: Normal appearance  HENT:      Head: Normocephalic and atraumatic  Pulmonary:      Effort: Pulmonary effort is normal  No respiratory distress  Musculoskeletal:      Cervical back: Normal range of motion  Right lower leg: Edema present  Left lower leg: Edema present  Skin:     General: Skin is warm and dry  Findings: Erythema and lesion present  Neurological:      Mental Status: He is alert and oriented to person, place, and time

## 2022-09-08 DIAGNOSIS — G89.29 CHRONIC PAIN OF BOTH KNEES: ICD-10-CM

## 2022-09-08 DIAGNOSIS — E66.09 CLASS 1 OBESITY DUE TO EXCESS CALORIES WITHOUT SERIOUS COMORBIDITY WITH BODY MASS INDEX (BMI) OF 33.0 TO 33.9 IN ADULT: ICD-10-CM

## 2022-09-08 DIAGNOSIS — E78.2 HYPERLIPIDEMIA, MIXED: ICD-10-CM

## 2022-09-08 DIAGNOSIS — M25.512 ACUTE PAIN OF BOTH SHOULDERS: ICD-10-CM

## 2022-09-08 DIAGNOSIS — E11.9 TYPE 2 DIABETES MELLITUS WITHOUT COMPLICATION, WITHOUT LONG-TERM CURRENT USE OF INSULIN (HCC): ICD-10-CM

## 2022-09-08 DIAGNOSIS — I10 ESSENTIAL HYPERTENSION: ICD-10-CM

## 2022-09-08 DIAGNOSIS — M25.561 CHRONIC PAIN OF BOTH KNEES: ICD-10-CM

## 2022-09-08 DIAGNOSIS — G89.29 CHRONIC PAIN OF RIGHT KNEE: ICD-10-CM

## 2022-09-08 DIAGNOSIS — M25.511 ACUTE PAIN OF BOTH SHOULDERS: ICD-10-CM

## 2022-09-08 DIAGNOSIS — M25.562 CHRONIC PAIN OF BOTH KNEES: ICD-10-CM

## 2022-09-08 DIAGNOSIS — M25.561 CHRONIC PAIN OF RIGHT KNEE: ICD-10-CM

## 2022-09-09 PROCEDURE — 4010F ACE/ARB THERAPY RXD/TAKEN: CPT | Performed by: PHYSICIAN ASSISTANT

## 2022-09-09 RX ORDER — HYDROCHLOROTHIAZIDE 25 MG/1
25 TABLET ORAL DAILY
Qty: 90 TABLET | Refills: 0 | Status: SHIPPED | OUTPATIENT
Start: 2022-09-09

## 2022-09-09 RX ORDER — GABAPENTIN 300 MG/1
300 CAPSULE ORAL DAILY
Qty: 90 CAPSULE | Refills: 0 | Status: SHIPPED | OUTPATIENT
Start: 2022-09-09 | End: 2022-12-08

## 2022-09-09 RX ORDER — AMLODIPINE BESYLATE 5 MG/1
5 TABLET ORAL DAILY
Qty: 90 TABLET | Refills: 0 | Status: SHIPPED | OUTPATIENT
Start: 2022-09-09 | End: 2022-12-08

## 2022-09-09 RX ORDER — ATORVASTATIN CALCIUM 10 MG/1
10 TABLET, FILM COATED ORAL DAILY
Qty: 90 TABLET | Refills: 0 | Status: SHIPPED | OUTPATIENT
Start: 2022-09-09 | End: 2022-12-08

## 2022-09-09 RX ORDER — MELOXICAM 15 MG/1
15 TABLET ORAL DAILY
Qty: 90 TABLET | Refills: 0 | Status: SHIPPED | OUTPATIENT
Start: 2022-09-09 | End: 2022-12-08

## 2022-09-09 RX ORDER — LOSARTAN POTASSIUM 50 MG/1
50 TABLET ORAL DAILY
Qty: 90 TABLET | Refills: 0 | Status: SHIPPED | OUTPATIENT
Start: 2022-09-09

## 2022-12-11 DIAGNOSIS — M25.561 CHRONIC PAIN OF RIGHT KNEE: ICD-10-CM

## 2022-12-11 DIAGNOSIS — M25.561 CHRONIC PAIN OF BOTH KNEES: ICD-10-CM

## 2022-12-11 DIAGNOSIS — M25.511 ACUTE PAIN OF BOTH SHOULDERS: ICD-10-CM

## 2022-12-11 DIAGNOSIS — G89.29 CHRONIC PAIN OF RIGHT KNEE: ICD-10-CM

## 2022-12-11 DIAGNOSIS — E78.2 HYPERLIPIDEMIA, MIXED: ICD-10-CM

## 2022-12-11 DIAGNOSIS — I10 ESSENTIAL HYPERTENSION: ICD-10-CM

## 2022-12-11 DIAGNOSIS — M25.562 CHRONIC PAIN OF BOTH KNEES: ICD-10-CM

## 2022-12-11 DIAGNOSIS — E66.09 CLASS 1 OBESITY DUE TO EXCESS CALORIES WITHOUT SERIOUS COMORBIDITY WITH BODY MASS INDEX (BMI) OF 33.0 TO 33.9 IN ADULT: ICD-10-CM

## 2022-12-11 DIAGNOSIS — M25.512 ACUTE PAIN OF BOTH SHOULDERS: ICD-10-CM

## 2022-12-11 DIAGNOSIS — G89.29 CHRONIC PAIN OF BOTH KNEES: ICD-10-CM

## 2022-12-11 DIAGNOSIS — E11.9 TYPE 2 DIABETES MELLITUS WITHOUT COMPLICATION, WITHOUT LONG-TERM CURRENT USE OF INSULIN (HCC): ICD-10-CM

## 2022-12-12 RX ORDER — HYDROCHLOROTHIAZIDE 25 MG/1
25 TABLET ORAL DAILY
Qty: 90 TABLET | Refills: 0 | Status: SHIPPED | OUTPATIENT
Start: 2022-12-12

## 2022-12-12 RX ORDER — ATORVASTATIN CALCIUM 10 MG/1
10 TABLET, FILM COATED ORAL DAILY
Qty: 90 TABLET | Refills: 0 | Status: SHIPPED | OUTPATIENT
Start: 2022-12-12 | End: 2023-03-12

## 2022-12-12 RX ORDER — AMLODIPINE BESYLATE 5 MG/1
5 TABLET ORAL DAILY
Qty: 90 TABLET | Refills: 0 | Status: SHIPPED | OUTPATIENT
Start: 2022-12-12 | End: 2023-03-12

## 2022-12-12 RX ORDER — GABAPENTIN 300 MG/1
300 CAPSULE ORAL DAILY
Qty: 90 CAPSULE | Refills: 0 | Status: SHIPPED | OUTPATIENT
Start: 2022-12-12 | End: 2023-03-12

## 2022-12-12 RX ORDER — LOSARTAN POTASSIUM 50 MG/1
50 TABLET ORAL DAILY
Qty: 90 TABLET | Refills: 0 | Status: SHIPPED | OUTPATIENT
Start: 2022-12-12

## 2022-12-12 RX ORDER — MELOXICAM 15 MG/1
15 TABLET ORAL DAILY
Qty: 90 TABLET | Refills: 0 | Status: SHIPPED | OUTPATIENT
Start: 2022-12-12 | End: 2023-03-12

## 2023-01-26 DIAGNOSIS — E11.9 TYPE 2 DIABETES MELLITUS WITHOUT COMPLICATION, WITHOUT LONG-TERM CURRENT USE OF INSULIN (HCC): ICD-10-CM

## 2023-02-17 ENCOUNTER — VBI (OUTPATIENT)
Dept: ADMINISTRATIVE | Facility: OTHER | Age: 58
End: 2023-02-17

## 2023-02-23 ENCOUNTER — TELEPHONE (OUTPATIENT)
Dept: FAMILY MEDICINE CLINIC | Facility: CLINIC | Age: 58
End: 2023-02-23

## 2023-02-23 DIAGNOSIS — F41.8 SITUATIONAL ANXIETY: Primary | ICD-10-CM

## 2023-02-23 RX ORDER — LORAZEPAM 0.5 MG/1
0.5 TABLET ORAL EVERY 8 HOURS PRN
Qty: 30 TABLET | Refills: 0 | Status: SHIPPED | OUTPATIENT
Start: 2023-02-23 | End: 2023-02-28

## 2023-02-23 NOTE — TELEPHONE ENCOUNTER
Pt's daughter in law and son Lyla Carr called office to notify provider that pt's wife passed away unexpectedly last night  Reports pt being very anxious and having difficulties coping at this time  Requesting possible PRN medication for anxiety right now

## 2023-02-28 DIAGNOSIS — F41.8 SITUATIONAL ANXIETY: ICD-10-CM

## 2023-02-28 RX ORDER — LORAZEPAM 0.5 MG/1
0.5 TABLET ORAL EVERY 8 HOURS PRN
Qty: 30 TABLET | Refills: 0 | Status: SHIPPED | OUTPATIENT
Start: 2023-02-28 | End: 2023-03-10

## 2023-03-09 DIAGNOSIS — E78.2 HYPERLIPIDEMIA, MIXED: ICD-10-CM

## 2023-03-09 DIAGNOSIS — M25.561 CHRONIC PAIN OF RIGHT KNEE: ICD-10-CM

## 2023-03-09 DIAGNOSIS — M25.512 ACUTE PAIN OF BOTH SHOULDERS: ICD-10-CM

## 2023-03-09 DIAGNOSIS — M25.561 CHRONIC PAIN OF BOTH KNEES: ICD-10-CM

## 2023-03-09 DIAGNOSIS — I10 ESSENTIAL HYPERTENSION: ICD-10-CM

## 2023-03-09 DIAGNOSIS — G89.29 CHRONIC PAIN OF RIGHT KNEE: ICD-10-CM

## 2023-03-09 DIAGNOSIS — E11.9 TYPE 2 DIABETES MELLITUS WITHOUT COMPLICATION, WITHOUT LONG-TERM CURRENT USE OF INSULIN (HCC): ICD-10-CM

## 2023-03-09 DIAGNOSIS — G89.29 CHRONIC PAIN OF BOTH KNEES: ICD-10-CM

## 2023-03-09 DIAGNOSIS — M25.562 CHRONIC PAIN OF BOTH KNEES: ICD-10-CM

## 2023-03-09 DIAGNOSIS — M25.511 ACUTE PAIN OF BOTH SHOULDERS: ICD-10-CM

## 2023-03-09 DIAGNOSIS — E66.09 CLASS 1 OBESITY DUE TO EXCESS CALORIES WITHOUT SERIOUS COMORBIDITY WITH BODY MASS INDEX (BMI) OF 33.0 TO 33.9 IN ADULT: ICD-10-CM

## 2023-03-09 RX ORDER — LOSARTAN POTASSIUM 50 MG/1
50 TABLET ORAL DAILY
Qty: 90 TABLET | Refills: 0 | Status: SHIPPED | OUTPATIENT
Start: 2023-03-09

## 2023-03-09 RX ORDER — GABAPENTIN 300 MG/1
300 CAPSULE ORAL DAILY
Qty: 90 CAPSULE | Refills: 0 | Status: SHIPPED | OUTPATIENT
Start: 2023-03-09 | End: 2023-06-07

## 2023-03-09 RX ORDER — HYDROCHLOROTHIAZIDE 25 MG/1
25 TABLET ORAL DAILY
Qty: 90 TABLET | Refills: 0 | Status: SHIPPED | OUTPATIENT
Start: 2023-03-09

## 2023-03-09 RX ORDER — MELOXICAM 15 MG/1
15 TABLET ORAL DAILY
Qty: 90 TABLET | Refills: 0 | Status: SHIPPED | OUTPATIENT
Start: 2023-03-09 | End: 2023-06-07

## 2023-03-09 RX ORDER — ATORVASTATIN CALCIUM 10 MG/1
10 TABLET, FILM COATED ORAL DAILY
Qty: 90 TABLET | Refills: 0 | Status: SHIPPED | OUTPATIENT
Start: 2023-03-09 | End: 2023-06-07

## 2023-03-09 RX ORDER — AMLODIPINE BESYLATE 5 MG/1
5 TABLET ORAL DAILY
Qty: 90 TABLET | Refills: 0 | Status: SHIPPED | OUTPATIENT
Start: 2023-03-09 | End: 2023-06-07

## 2023-03-16 DIAGNOSIS — I10 ESSENTIAL HYPERTENSION: Primary | ICD-10-CM

## 2023-03-16 DIAGNOSIS — E78.2 HYPERLIPIDEMIA, MIXED: ICD-10-CM

## 2023-03-16 DIAGNOSIS — E11.9 TYPE 2 DIABETES MELLITUS WITHOUT COMPLICATION, WITHOUT LONG-TERM CURRENT USE OF INSULIN (HCC): ICD-10-CM

## 2023-03-16 DIAGNOSIS — F41.8 SITUATIONAL ANXIETY: ICD-10-CM

## 2023-03-16 RX ORDER — LORAZEPAM 0.5 MG/1
0.5 TABLET ORAL EVERY 8 HOURS PRN
Qty: 30 TABLET | Refills: 0 | Status: SHIPPED | OUTPATIENT
Start: 2023-03-16 | End: 2023-03-23

## 2023-03-23 DIAGNOSIS — F41.8 SITUATIONAL ANXIETY: ICD-10-CM

## 2023-03-23 RX ORDER — LORAZEPAM 0.5 MG/1
0.5 TABLET ORAL EVERY 8 HOURS PRN
Qty: 30 TABLET | Refills: 0 | Status: SHIPPED | OUTPATIENT
Start: 2023-03-23 | End: 2023-04-02

## 2023-05-15 ENCOUNTER — OFFICE VISIT (OUTPATIENT)
Dept: FAMILY MEDICINE CLINIC | Facility: CLINIC | Age: 58
End: 2023-05-15

## 2023-05-15 VITALS
HEART RATE: 78 BPM | SYSTOLIC BLOOD PRESSURE: 112 MMHG | TEMPERATURE: 98 F | BODY MASS INDEX: 28.36 KG/M2 | DIASTOLIC BLOOD PRESSURE: 62 MMHG | WEIGHT: 221 LBS | HEIGHT: 74 IN | OXYGEN SATURATION: 95 %

## 2023-05-15 DIAGNOSIS — Z11.59 ENCOUNTER FOR HEPATITIS C SCREENING TEST FOR LOW RISK PATIENT: ICD-10-CM

## 2023-05-15 DIAGNOSIS — Z12.11 ENCOUNTER FOR SCREENING COLONOSCOPY: ICD-10-CM

## 2023-05-15 DIAGNOSIS — Z12.5 SCREENING FOR PROSTATE CANCER: ICD-10-CM

## 2023-05-15 DIAGNOSIS — M25.512 CHRONIC PAIN OF BOTH SHOULDERS: ICD-10-CM

## 2023-05-15 DIAGNOSIS — M25.511 CHRONIC PAIN OF BOTH SHOULDERS: ICD-10-CM

## 2023-05-15 DIAGNOSIS — M25.561 CHRONIC PAIN OF RIGHT KNEE: ICD-10-CM

## 2023-05-15 DIAGNOSIS — K43.9 VENTRAL HERNIA WITHOUT OBSTRUCTION OR GANGRENE: ICD-10-CM

## 2023-05-15 DIAGNOSIS — G89.29 CHRONIC PAIN OF BOTH SHOULDERS: ICD-10-CM

## 2023-05-15 DIAGNOSIS — G89.29 CHRONIC PAIN OF RIGHT KNEE: ICD-10-CM

## 2023-05-15 DIAGNOSIS — E11.9 TYPE 2 DIABETES MELLITUS WITHOUT COMPLICATION, WITHOUT LONG-TERM CURRENT USE OF INSULIN (HCC): Primary | ICD-10-CM

## 2023-05-15 DIAGNOSIS — E66.09 CLASS 1 OBESITY DUE TO EXCESS CALORIES WITHOUT SERIOUS COMORBIDITY WITH BODY MASS INDEX (BMI) OF 33.0 TO 33.9 IN ADULT: ICD-10-CM

## 2023-05-15 DIAGNOSIS — M25.361 INSTABILITY OF KNEE JOINT, RIGHT: ICD-10-CM

## 2023-05-15 DIAGNOSIS — R26.89 ANTALGIC GAIT: ICD-10-CM

## 2023-05-15 DIAGNOSIS — F43.21 GRIEF: ICD-10-CM

## 2023-05-15 DIAGNOSIS — I10 ESSENTIAL HYPERTENSION: ICD-10-CM

## 2023-05-15 DIAGNOSIS — E78.2 HYPERLIPIDEMIA, MIXED: ICD-10-CM

## 2023-05-15 PROBLEM — E66.811 CLASS 1 OBESITY DUE TO EXCESS CALORIES WITHOUT SERIOUS COMORBIDITY WITH BODY MASS INDEX (BMI) OF 33.0 TO 33.9 IN ADULT: Status: RESOLVED | Noted: 2018-11-05 | Resolved: 2023-05-15

## 2023-05-15 LAB
ALBUMIN CREATININE RATIO (HISTORICAL): ABNORMAL
ALBUMIN UR QL: ABNORMAL
CREAT BLD-SCNC: ABNORMAL UMOL/L
SL AMB POCT HEMOGLOBIN AIC: 6.4 (ref ?–6.5)

## 2023-05-15 NOTE — PROGRESS NOTES
Name: Suzanna Doty      : 1965      MRN: [id number]  Encounter Provider: CLARISSA Diaz  Encounter Date: 5/15/2023   Encounter department: [address] [place] [address]     1  Type 2 diabetes mellitus without complication, without long-term current use of insulin [facility])  Assessment & Plan:    Lab Results   Component Value Date    HGBA1C 6 4 05/15/2023   HA1C Metformin 500 mg daily    Orders:  -     POCT hemoglobin A1c  -     IRIS Diabetic eye exam  -     POCT urine microalbumin/creatinine  -     Comprehensive metabolic panel; Future  -     CBC and differential; Future  -     Lipid Panel with Direct LDL reflex; Future  -     metFORMIN (GLUCOPHAGE) 500 mg tablet; Take 1 tablet (500 mg total) by mouth daily with breakfast    2  Essential hypertension  Assessment & Plan:  BP is too well controlled taking the Losartan 50 mg will stop the Amlodipine and HCTZ    Orders:  -     Comprehensive metabolic panel; Future  -     CBC and differential; Future  -     Lipid Panel with Direct LDL reflex; Future    3  Ventral hernia without obstruction or gangrene  Assessment & Plan:  Denies any pain int he abdomen       4  Instability of knee joint, right    5  Hyperlipidemia, mixed  Assessment & Plan:  Lipitor 10 mg daily    Orders:  -     Comprehensive metabolic panel; Future  -     Lipid Panel with Direct LDL reflex; Future    6  Class 1 obesity due to excess calories without serious comorbidity with body mass index (BMI) of 33 0 to 33 9 in adult    7  Chronic pain of right knee  Assessment & Plan:  Patient knee has improved with weight loss      8  Antalgic gait    9  Body mass index (BMI) 28 0-28 9, adult  Assessment & Plan:  Lost 20 pounds       10  Screening for prostate cancer  -     PSA, Total Screen; Future    11  Encounter for hepatitis C screening test for low risk patient  -     Hepatitis C antibody; Future    12   Grief  -     Ambulatory Referral to [place] [facility]; Future    13  Encounter for screening colonoscopy  -     Ambulatory referral for colonoscopy; Future    14  Chronic pain of both shoulders  -     XR shoulder 2+ vw right; Future; Expected date: 05/15/2023  -     XR shoulder 2+ vw left; Future; Expected date: 05/15/2023      BMI Counseling: Body mass index is 28 37 kg/m²  The BMI is above normal  Nutrition recommendations include decreasing portion sizes, encouraging healthy choices of fruits and vegetables, decreasing fast food intake, consuming healthier snacks, limiting drinks that contain sugar and moderation in carbohydrate intake  Exercise recommendations include moderate physical activity 150 minutes/week  Rationale for BMI follow-up plan is due to patient being overweight or obese  Depression Screening and Follow-up Plan: Patient was screened for depression during today's encounter  They screened negative with a PHQ-2 score of 0  Subjective      Patient here today for his check up and to review his medications and he is having troubles when raising the arms above his head he is having a popping sensation and has some weakness in his arms when raised overhead  He is also requesting a therapist referral his wife passed away from suicide about 4 months ago    Review of Systems   Constitutional: Negative for activity change, appetite change, chills, diaphoresis, fatigue, fever and unexpected weight change  HENT: Negative for congestion, ear pain, hearing loss, postnasal drip, sinus pressure, sinus pain, sneezing and sore throat  Eyes: Negative for pain, redness and visual disturbance  Respiratory: Negative for cough and shortness of breath  Cardiovascular: Negative for chest pain and leg swelling  Gastrointestinal: Negative for abdominal pain, diarrhea, nausea and vomiting  Endocrine: Negative  Genitourinary: Negative  Musculoskeletal: Positive for arthralgias  Allergic/Immunologic: Negative      Neurological: Negative for "dizziness and light-headedness  Psychiatric/Behavioral: Negative for behavioral problems and dysphoric mood  Current Outpatient Medications on File Prior to Visit   Medication Sig   • atorvastatin (LIPITOR) 10 mg tablet TAKE 1 TABLET (10 MG TOTAL) BY MOUTH DAILY   • gabapentin (NEURONTIN) 300 mg capsule TAKE 1 CAPSULE (300 MG TOTAL) BY MOUTH DAILY   • losartan (COZAAR) 50 mg tablet TAKE 1 TABLET (50 MG TOTAL) BY MOUTH DAILY   • meloxicam (MOBIC) 15 mg tablet Take 1 tablet (15 mg total) by mouth daily   • OneTouch Ultra test strip USE TO TEST THREE TO FOUR TIMES DAILY AS DIRECTED   • Pharmacist Choice Lancets MISC Use to test three to four times daily   • [DISCONTINUED] amLODIPine (NORVASC) 5 mg tablet Take 1 tablet (5 mg total) by mouth daily   • [DISCONTINUED] hydrochlorothiazide (HYDRODIURIL) 25 mg tablet TAKE 1 TABLET (25 MG TOTAL) BY MOUTH DAILY   • [DISCONTINUED] metFORMIN (GLUCOPHAGE) 500 mg tablet TAKE 1 TABLET (500 MG TOTAL) BY MOUTH 2 (TWO) TIMES A DAY WITH MEALS   • [DISCONTINUED] LORazepam (ATIVAN) 0 5 mg tablet TAKE 1 TABLET (0 5 MG TOTAL) BY MOUTH EVERY 8 (EIGHT) HOURS AS NEEDED FOR ANXIETY FOR UP TO 10 DAYS (Patient not taking: Reported on 5/15/2023)       Objective     /62 (BP Location: Left arm, Patient Position: Sitting, Cuff Size: Large)   Pulse 78   Temp 98 °F (36 7 °C)   Ht 6' 2\" (1 88 m)   Wt 100 kg (221 lb)   SpO2 95%   BMI 28 37 kg/m²     Physical Exam  Vitals and nursing note reviewed  Constitutional:       General: He is not in acute distress  Appearance: He is well-developed  HENT:      Head: Normocephalic and atraumatic  Right Ear: Tympanic membrane normal       Left Ear: Tympanic membrane normal       Nose: Nose normal       Mouth/Throat:      Mouth: Mucous membranes are moist    Eyes:      Pupils: Pupils are equal, round, and reactive to light  Neck:      Thyroid: No thyromegaly  Cardiovascular:      Rate and Rhythm: Normal rate and regular rhythm      " Pulses: no weak pulses          Dorsalis pedis pulses are 2+ on the right side and 2+ on the left side  Posterior tibial pulses are 2+ on the right side and 2+ on the left side  Heart sounds: Normal heart sounds  No murmur heard  Pulmonary:      Effort: Pulmonary effort is normal  No respiratory distress  Breath sounds: Normal breath sounds  No wheezing  Abdominal:      General: Bowel sounds are normal       Palpations: Abdomen is soft  Musculoskeletal:         General: Normal range of motion  Cervical back: Normal range of motion  Feet:      Right foot:      Skin integrity: No ulcer, skin breakdown, erythema, warmth, callus or dry skin  Left foot:      Skin integrity: No ulcer, skin breakdown, erythema, warmth, callus or dry skin  Skin:     General: Skin is warm and dry  Neurological:      General: No focal deficit present  Mental Status: He is alert and oriented to person, place, and time  Psychiatric:         Mood and Affect: Mood normal         Patient's shoes and socks removed  Right Foot/Ankle   Right Foot Inspection  Skin Exam: skin normal and skin intact  No dry skin, no warmth, no callus, no erythema, no maceration, no abnormal color, no pre-ulcer, no ulcer and no callus  Toe Exam: ROM and strength within normal limits  Sensory   Vibration: intact  Proprioception: intact  Monofilament testing: intact    Vascular  Capillary refills: < 3 seconds  The right DP pulse is 2+  The right PT pulse is 2+  Left Foot/Ankle  Left Foot Inspection  Skin Exam: skin normal and skin intact  No dry skin, no warmth, no erythema, no maceration, normal color, no pre-ulcer, no ulcer and no callus  Toe Exam: ROM and strength within normal limits  Sensory   Vibration: intact  Proprioception: intact  Monofilament testing: intact    Vascular  Capillary refills: < 3 seconds  The left DP pulse is 2+  The left PT pulse is 2+       Assign Risk Category  No deformity present  No loss of protective sensation  No weak pulses  Risk: CLARISSA Flanagan

## 2023-06-15 ENCOUNTER — TELEPHONE (OUTPATIENT)
Dept: GASTROENTEROLOGY | Facility: CLINIC | Age: 58
End: 2023-06-15

## 2023-06-15 ENCOUNTER — PREP FOR PROCEDURE (OUTPATIENT)
Dept: GASTROENTEROLOGY | Facility: CLINIC | Age: 58
End: 2023-06-15

## 2023-06-15 DIAGNOSIS — M25.512 ACUTE PAIN OF BOTH SHOULDERS: ICD-10-CM

## 2023-06-15 DIAGNOSIS — Z12.11 SCREENING FOR COLON CANCER: Primary | ICD-10-CM

## 2023-06-15 DIAGNOSIS — G89.29 CHRONIC PAIN OF BOTH KNEES: ICD-10-CM

## 2023-06-15 DIAGNOSIS — M25.561 CHRONIC PAIN OF BOTH KNEES: ICD-10-CM

## 2023-06-15 DIAGNOSIS — M25.562 CHRONIC PAIN OF BOTH KNEES: ICD-10-CM

## 2023-06-15 DIAGNOSIS — M25.511 ACUTE PAIN OF BOTH SHOULDERS: ICD-10-CM

## 2023-06-15 RX ORDER — MELOXICAM 15 MG/1
15 TABLET ORAL DAILY
Qty: 90 TABLET | Refills: 0 | Status: SHIPPED | OUTPATIENT
Start: 2023-06-15 | End: 2023-09-13

## 2023-06-15 NOTE — TELEPHONE ENCOUNTER
Scheduled date of colonoscopy (as of today): 8/31/2023    Physician performing colonoscopy: Dr Jose Chacon    Location of colonoscopy: [place]    Clearances: N/A Usman Metcalf is a 43 year old male presenting with nerve concerns   Patient reports tightening muscles starting from lower back all the way down into right leg  Advance Directives:  not discussed  Refills needed 0  PHQ-9 12  Verified PCP     Health Maintenance Due   Topic Date Due   • COVID-19 Vaccine (1) Never done   • Colorectal Cancer Screen-  Never done   • DTaP/Tdap/Td Vaccine (2 - Td or Tdap) 02/09/2022       Patient is due for topics as listed above but is not proceeding with Immunization(s) COVID-19 and Dtap/Tdap/Td and Colorectal Cancer Screening: Colonoscopy at this time.      Latex allergies patient denies  Medications verified, updated.  Tobacco history reviewed.  Pharmacy verified.

## 2023-06-23 ENCOUNTER — SOCIAL WORK (OUTPATIENT)
Dept: BEHAVIORAL/MENTAL HEALTH CLINIC | Facility: CLINIC | Age: 58
End: 2023-06-23
Payer: COMMERCIAL

## 2023-06-23 DIAGNOSIS — F43.21 GRIEF: Primary | ICD-10-CM

## 2023-06-23 PROCEDURE — 90791 PSYCH DIAGNOSTIC EVALUATION: CPT | Performed by: SOCIAL WORKER
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
## 2023-07-14 PROBLEM — Z12.5 SCREENING FOR PROSTATE CANCER: Status: RESOLVED | Noted: 2023-05-15 | Resolved: 2023-07-14

## 2023-07-27 ENCOUNTER — SOCIAL WORK (OUTPATIENT)
Dept: BEHAVIORAL/MENTAL HEALTH CLINIC | Facility: CLINIC | Age: 58
End: 2023-07-27
Payer: COMMERCIAL

## 2023-07-27 DIAGNOSIS — F43.21 GRIEF: Primary | ICD-10-CM

## 2023-07-27 DIAGNOSIS — F43.21 SITUATIONAL DEPRESSION: ICD-10-CM

## 2023-07-27 PROCEDURE — 90834 PSYTX W PT 45 MINUTES: CPT | Performed by: SOCIAL WORKER

## 2023-07-27 NOTE — PSYCH
Behavioral Health Psychotherapy Progress Note    Psychotherapy Provided: Individual Psychotherapy     1. Grief        2. Situational depression            Goals addressed in session: Goal 1     DATA: Amisha Monteiro states his wife left 3 notes and one was on a pillow. She left one that told him she loved him   He states that his wife was in a great deal of pain,  She was going to different doctors and no one could find anything wrong with her. She was diabetic and had stage 3 renal failure. He states she did what she could with the diet. He states that going back to work was the best thing for him so he was not sitting around thinking. He says his 3 boys are a big support as is his friend Celestino Hatch. We discussed how his kids are dealing with the loss of their mother and the circumstances. He states his wife's emotional pain was just as intense as her physical pain. She would hold grudges and not let them go. He cheated on her prior to marriage and she never let that go in the 27 years they were together. We processed. She really did not have a lot of friends. They never really socialized,   She had friends on the Internet. She has been talking to guys in the Internet. He always tried to keep her because he loved her. We processed his belief system. Every once in a while he wishes he was with her, but no plan and no intent. He did get rid of her clothing about 3 weeks ago. His oldest and middle aged son are not biologically his but he raised them. His kids talk to him and this has brought them closer both individually and collectively. We discussed his goals. He is appropriately dressed coming from work and bearded,  Thought process is logical and speech is normal rate, volume and content. During this session, this clinician used the following therapeutic modalities: Engagement Strategies, Bereavement Therapy and Cognitive Behavioral Therapy    Substance Abuse was not addressed during this session.  If the client is diagnosed with a co-occurring substance use disorder, please indicate any changes in the frequency or amount of use: N/A. Stage of change for addressing substance use diagnoses: No substance use/Not applicable    ASSESSMENT:  Carla Velazquez presents with a Euthymic/ normal mood. his affect is Normal range and intensity, which is congruent, with his mood and the content of the session. The client has made progress on their goals. [place] is progressing as evidenced by his crying this session and nor apologizing for it. Carla Velazquez presents with a none risk of suicide, none risk of self-harm, and none risk of harm to others. For any risk assessment that surpasses a "low" rating, a safety plan must be developed. A safety plan was indicated: no  If yes, describe in detail N/A    PLAN: Between sessions, Carla Velazquez will continue with the grieving process. At the next session, the therapist will use Bereavement Therapy, Cognitive Behavioral Therapy and Supportive Psychotherapy to address his current grieve over the suicide of his wife. Behavioral Health Treatment Plan and Discharge Planning: Carla Velazquez is aware of and agrees to continue to work on their treatment plan. They have identified and are working toward their discharge goals.  yes    Visit start and stop times:    07/27/23  Start Time: 4493  Stop Time: 1638  Total Visit Time: 47 minutes

## 2023-08-24 ENCOUNTER — SOCIAL WORK (OUTPATIENT)
Dept: BEHAVIORAL/MENTAL HEALTH CLINIC | Facility: CLINIC | Age: 58
End: 2023-08-24
Payer: COMMERCIAL

## 2023-08-24 ENCOUNTER — TELEPHONE (OUTPATIENT)
Dept: GASTROENTEROLOGY | Facility: CLINIC | Age: 58
End: 2023-08-24

## 2023-08-24 DIAGNOSIS — F43.21 SITUATIONAL DEPRESSION: ICD-10-CM

## 2023-08-24 DIAGNOSIS — F43.21 GRIEF: Primary | ICD-10-CM

## 2023-08-24 PROCEDURE — 90834 PSYTX W PT 45 MINUTES: CPT | Performed by: SOCIAL WORKER

## 2023-08-24 NOTE — PSYCH
Behavioral Health Psychotherapy Progress Note    Psychotherapy Provided: Individual Psychotherapy     1. Grief        2. Situational depression            Goals addressed in session: Goal 1     DATA:   Amber Richards states that he has been doing things and he went to the beach with friends and they walked the board walk. He has come to the conclusion that his wife saw no other way out of her emotional and physical pain. He went to his younger son's surprise birthday party. He would like to spend more time with them but right now he is working in [place]. He is commuting with his boss. He appears to be doing better and is spending time with friends at a camp ground. He is getting out and he feels like his problem is he sits back and waits and does sit and watch and he does not get involved. His wife would do more and get up and talk to people and he would sit back,  She was more outgoing than him. Right now he is accepting invitations as he does not want to be home alone. He is appropriately dressed and groomed coming from work. His thought process is logical and speech is normal rate, volume and content. The people that he stayed with gave him their dog as the fog attached to him immediately. The dog has been doing great and she is exercising and lost weight and is by his side no matter what. Him and his son's do not really talk about the way their mom . He feels bad that she took her own life. He is not angry at her for what happened. We processed his relationship with his son who was not biologically his at length. Only his youngest son is his biologically. During this session, this clinician used the following therapeutic modalities: Cognitive Behavioral Therapy    Substance Abuse was not addressed during this session. If the client is diagnosed with a co-occurring substance use disorder, please indicate any changes in the frequency or amount of use: N/A.  Stage of change for addressing substance use diagnoses: No substance use/Not applicable    ASSESSMENT:  Aleshia Nascimento presents with a Euthymic/ normal mood. his affect is Normal range and intensity, which is congruent, with his mood and the content of the session. The client has made progress on their goals. Aleshia Nascimento presents with a none risk of suicide, none risk of self-harm, and none risk of harm to others. For any risk assessment that surpasses a "low" rating, a safety plan must be developed. A safety plan was indicated: no  If yes, describe in detail N/A    PLAN: Between sessions, Aleshia Nascimento will continue with the grieving process through this year of the firsts with out his wife. At the next session, the therapist will use Bereavement Therapy and Cognitive Behavioral Therapy to address his grief and assist with the grieving process and offer support. .    Behavioral Health Treatment Plan and Discharge Planning: Aleshia Nascimento is aware of and agrees to continue to work on their treatment plan. They have identified and are working toward their discharge goals.  yes    Visit start and stop times:    08/24/23  Start Time: 7750  Stop Time: 1642  Total Visit Time: 48 minutes

## 2023-08-24 NOTE — TELEPHONE ENCOUNTER
Agency/Facility Name: [facility]  Outcome: DPA left v-mail for admissions following up on acceptance of Pt.    DPA informed CM of update    1036  Agency/Facility Name: [facility]  Spoke To: Chelsy  Outcome: They have started Insurance Authorization and will update DPA this afternoon on status.    DPA notified CM of update    1317  Agency/Facility Name: [facility]  Spoke To: Chelsy  Outcome: Still waiting on Insurance Authorization, they will call and update DPA in the morning.  They are holding bed for Pt.    DPA informed CM of update           Called to confirm pts Colonoscopy -he is out of town and reschled- he has a copy of his Dulco/Miralax Instructions    10/31/23 with Dr Kemar Ibarra

## 2023-08-28 ENCOUNTER — TELEPHONE (OUTPATIENT)
Dept: FAMILY MEDICINE CLINIC | Facility: CLINIC | Age: 58
End: 2023-08-28

## 2023-08-28 NOTE — TELEPHONE ENCOUNTER
Pt unable to make it into his appt today as he is down in [facility]. He is available next Wednesday/thursday then he will be back down in [facility] working for the next 4 months. No availability on those days. Please advise.

## 2023-09-02 LAB
ALBUMIN SERPL-MCNC: 4.5 G/DL (ref 3.8–4.9)
ALBUMIN/GLOB SERPL: 1.7 {RATIO} (ref 1.2–2.2)
ALP SERPL-CCNC: 94 IU/L (ref 44–121)
ALT SERPL-CCNC: 17 IU/L (ref 0–44)
AST SERPL-CCNC: 17 IU/L (ref 0–40)
BASOPHILS # BLD AUTO: 0 X10E3/UL (ref 0–0.2)
BASOPHILS NFR BLD AUTO: 0 %
BILIRUB SERPL-MCNC: 0.4 MG/DL (ref 0–1.2)
BUN SERPL-MCNC: 16 MG/DL (ref 6–24)
BUN/CREAT SERPL: 18 (ref 9–20)
CALCIUM SERPL-MCNC: 9.3 MG/DL (ref 8.7–10.2)
CHLORIDE SERPL-SCNC: 104 MMOL/L (ref 96–106)
CHOLEST SERPL-MCNC: 129 MG/DL (ref 100–199)
CO2 SERPL-SCNC: 25 MMOL/L (ref 20–29)
CREAT SERPL-MCNC: 0.89 MG/DL (ref 0.76–1.27)
EGFR: 99 ML/MIN/1.73
EOSINOPHIL # BLD AUTO: 0.2 X10E3/UL (ref 0–0.4)
EOSINOPHIL NFR BLD AUTO: 3 %
ERYTHROCYTE [DISTWIDTH] IN BLOOD BY AUTOMATED COUNT: 13.3 % (ref 11.6–15.4)
GLOBULIN SER-MCNC: 2.6 G/DL (ref 1.5–4.5)
GLUCOSE SERPL-MCNC: 105 MG/DL (ref 70–99)
HCT VFR BLD AUTO: 44.3 % (ref 37.5–51)
HCV AB S/CO SERPL IA: NON REACTIVE
HDLC SERPL-MCNC: 42 MG/DL
HGB BLD-MCNC: 14.4 G/DL (ref 13–17.7)
IMM GRANULOCYTES # BLD: 0 X10E3/UL (ref 0–0.1)
IMM GRANULOCYTES NFR BLD: 0 %
LDLC SERPL CALC-MCNC: 69 MG/DL (ref 0–99)
LYMPHOCYTES # BLD AUTO: 1.5 X10E3/UL (ref 0.7–3.1)
LYMPHOCYTES NFR BLD AUTO: 21 %
MCH RBC QN AUTO: 28.5 PG (ref 26.6–33)
MCHC RBC AUTO-ENTMCNC: 32.5 G/DL (ref 31.5–35.7)
MCV RBC AUTO: 88 FL (ref 79–97)
MONOCYTES # BLD AUTO: 0.8 X10E3/UL (ref 0.1–0.9)
MONOCYTES NFR BLD AUTO: 11 %
NEUTROPHILS # BLD AUTO: 4.8 X10E3/UL (ref 1.4–7)
NEUTROPHILS NFR BLD AUTO: 65 %
PLATELET # BLD AUTO: 254 X10E3/UL (ref 150–450)
POTASSIUM SERPL-SCNC: 4.1 MMOL/L (ref 3.5–5.2)
PROT SERPL-MCNC: 7.1 G/DL (ref 6–8.5)
PSA SERPL-MCNC: 1.2 NG/ML (ref 0–4)
RBC # BLD AUTO: 5.06 X10E6/UL (ref 4.14–5.8)
SL AMB REFLEX CRITERIA: NORMAL
SL AMB VLDL CHOLESTEROL CALC: 18 MG/DL (ref 5–40)
SODIUM SERPL-SCNC: 142 MMOL/L (ref 134–144)
TRIGL SERPL-MCNC: 95 MG/DL (ref 0–149)
WBC # BLD AUTO: 7.3 X10E3/UL (ref 3.4–10.8)

## 2023-09-07 ENCOUNTER — APPOINTMENT (OUTPATIENT)
Dept: RADIOLOGY | Facility: CLINIC | Age: 58
End: 2023-09-07
Payer: COMMERCIAL

## 2023-09-07 ENCOUNTER — OFFICE VISIT (OUTPATIENT)
Dept: FAMILY MEDICINE CLINIC | Facility: CLINIC | Age: 58
End: 2023-09-07
Payer: COMMERCIAL

## 2023-09-07 VITALS
DIASTOLIC BLOOD PRESSURE: 68 MMHG | TEMPERATURE: 97.9 F | BODY MASS INDEX: 28.31 KG/M2 | WEIGHT: 220.6 LBS | HEIGHT: 74 IN | OXYGEN SATURATION: 98 % | SYSTOLIC BLOOD PRESSURE: 118 MMHG | HEART RATE: 78 BPM

## 2023-09-07 DIAGNOSIS — M25.561 CHRONIC PAIN OF BOTH KNEES: ICD-10-CM

## 2023-09-07 DIAGNOSIS — M25.511 ACUTE PAIN OF BOTH SHOULDERS: ICD-10-CM

## 2023-09-07 DIAGNOSIS — I10 ESSENTIAL HYPERTENSION: ICD-10-CM

## 2023-09-07 DIAGNOSIS — G89.29 CHRONIC PAIN OF BOTH KNEES: ICD-10-CM

## 2023-09-07 DIAGNOSIS — E66.09 CLASS 1 OBESITY DUE TO EXCESS CALORIES WITHOUT SERIOUS COMORBIDITY WITH BODY MASS INDEX (BMI) OF 33.0 TO 33.9 IN ADULT: ICD-10-CM

## 2023-09-07 DIAGNOSIS — G89.29 CHRONIC PAIN OF BOTH SHOULDERS: ICD-10-CM

## 2023-09-07 DIAGNOSIS — E11.9 TYPE 2 DIABETES MELLITUS WITHOUT COMPLICATION, WITHOUT LONG-TERM CURRENT USE OF INSULIN (HCC): Primary | ICD-10-CM

## 2023-09-07 DIAGNOSIS — M25.512 CHRONIC PAIN OF BOTH SHOULDERS: ICD-10-CM

## 2023-09-07 DIAGNOSIS — E78.2 HYPERLIPIDEMIA, MIXED: ICD-10-CM

## 2023-09-07 DIAGNOSIS — F43.21 SITUATIONAL DEPRESSION: ICD-10-CM

## 2023-09-07 DIAGNOSIS — M25.511 CHRONIC PAIN OF BOTH SHOULDERS: ICD-10-CM

## 2023-09-07 DIAGNOSIS — M25.562 CHRONIC PAIN OF BOTH KNEES: ICD-10-CM

## 2023-09-07 DIAGNOSIS — M25.561 CHRONIC PAIN OF RIGHT KNEE: ICD-10-CM

## 2023-09-07 DIAGNOSIS — M25.512 ACUTE PAIN OF BOTH SHOULDERS: ICD-10-CM

## 2023-09-07 DIAGNOSIS — G89.29 CHRONIC PAIN OF RIGHT KNEE: ICD-10-CM

## 2023-09-07 LAB — SL AMB POCT HEMOGLOBIN AIC: 6 (ref ?–6.5)

## 2023-09-07 PROCEDURE — 73030 X-RAY EXAM OF SHOULDER: CPT

## 2023-09-07 PROCEDURE — 83036 HEMOGLOBIN GLYCOSYLATED A1C: CPT | Performed by: NURSE PRACTITIONER

## 2023-09-07 PROCEDURE — 99214 OFFICE O/P EST MOD 30 MIN: CPT | Performed by: NURSE PRACTITIONER

## 2023-09-07 RX ORDER — GABAPENTIN 300 MG/1
300 CAPSULE ORAL 2 TIMES DAILY
Qty: 180 CAPSULE | Refills: 3 | Status: SHIPPED | OUTPATIENT
Start: 2023-09-07 | End: 2023-12-06

## 2023-09-07 RX ORDER — LOSARTAN POTASSIUM 50 MG/1
50 TABLET ORAL DAILY
Qty: 90 TABLET | Refills: 3 | Status: SHIPPED | OUTPATIENT
Start: 2023-09-07

## 2023-09-07 RX ORDER — MELOXICAM 15 MG/1
15 TABLET ORAL DAILY
Qty: 90 TABLET | Refills: 3 | Status: SHIPPED | OUTPATIENT
Start: 2023-09-07 | End: 2023-12-06

## 2023-09-07 RX ORDER — ATORVASTATIN CALCIUM 10 MG/1
10 TABLET, FILM COATED ORAL DAILY
Qty: 90 TABLET | Refills: 3 | Status: SHIPPED | OUTPATIENT
Start: 2023-09-07 | End: 2023-12-06

## 2023-09-07 NOTE — ASSESSMENT & PLAN NOTE
Patient is doing ok with his depression and has been following with in office therapist following the passing of his wife

## 2023-09-07 NOTE — ASSESSMENT & PLAN NOTE
Lab Results   Component Value Date    HGBA1C 6.0 09/07/2023   Well controlled diabetes currently on Metformin 500 mg bid and is taking without problems

## 2023-09-07 NOTE — PROGRESS NOTES
Name: Yamila Quan      : 1965      MRN: [id number]  Encounter Provider: CLARISSA Hayes  Encounter Date: 2023   Encounter department: [address] [place] [address]     1. Type 2 diabetes mellitus without complication, without long-term current use of insulin ([address] [facility])  Assessment & Plan:    Lab Results   Component Value Date    HGBA1C 6.0 2023   Well controlled diabetes currently on Metformin 500 mg bid and is taking without problems     Orders:  -     POCT hemoglobin A1c  -     atorvastatin (LIPITOR) 10 mg tablet; Take 1 tablet (10 mg total) by mouth daily  -     metFORMIN (GLUCOPHAGE) 500 mg tablet; Take 1 tablet (500 mg total) by mouth 2 (two) times a day with meals  -     Comprehensive metabolic panel; Future  -     Lipid Panel with Direct LDL reflex; Future  -     HEMOGLOBIN A1C W/ EAG ESTIMATION; Future  -     Albumin / creatinine urine ratio  -     CBC and differential; Future    2. Class 1 obesity due to excess calories without serious comorbidity with body mass index (BMI) of 33.0 to 33.9 in adult  -     atorvastatin (LIPITOR) 10 mg tablet; Take 1 tablet (10 mg total) by mouth daily    3. Hyperlipidemia, mixed  Assessment & Plan:  Lipid panel is well controlled at goal of LDL 70 Lipitor 10 mg     Orders:  -     atorvastatin (LIPITOR) 10 mg tablet; Take 1 tablet (10 mg total) by mouth daily  -     Lipid Panel with Direct LDL reflex; Future    4. Chronic pain of right knee  -     gabapentin (NEURONTIN) 300 mg capsule; Take 1 capsule (300 mg total) by mouth 2 (two) times a day    5. Essential hypertension  Assessment & Plan:  Patient taking the Losartan 50 mg daily and is well controlled     Orders:  -     losartan (COZAAR) 50 mg tablet; Take 1 tablet (50 mg total) by mouth daily    6. Chronic pain of both knees  -     meloxicam (MOBIC) 15 mg tablet; Take 1 tablet (15 mg total) by mouth daily    7.  Acute pain of both shoulders  -     meloxicam (MOBIC) 15 mg tablet; Take 1 tablet (15 mg total) by mouth daily    8. Situational depression  Assessment & Plan:  Patient is doing ok with his depression and has been following with in office therapist following the passing of his wife       5. Chronic pain of both shoulders  Assessment & Plan:  Plans to check x-rays of the shoulder and will consider seeing orthopedics schedule permitting       10. Body mass index (BMI) 28.0-28.9, adult  Assessment & Plan:  Almost at his goal weight of 210 lbs              Subjective      Patient here today for his check up he has no present complaints. He does state that appointments can be challenging with working in [employer] and has been commuting to daily. He is overall doing well. Review of Systems   Constitutional: Negative for activity change, appetite change, chills, diaphoresis, fatigue, fever and unexpected weight change. HENT: Negative for congestion, ear pain, hearing loss, postnasal drip, sinus pressure, sinus pain, sneezing and sore throat. Eyes: Negative for pain, redness and visual disturbance. Respiratory: Negative for cough and shortness of breath. Cardiovascular: Negative for chest pain and leg swelling. Gastrointestinal: Negative for abdominal pain, diarrhea, nausea and vomiting. Genitourinary: Negative. Musculoskeletal: Positive for arthralgias and back pain. Skin: Negative. Neurological: Negative for dizziness and light-headedness. Hematological: Negative. Psychiatric/Behavioral: Negative for behavioral problems and dysphoric mood.        Current Outpatient Medications on File Prior to Visit   Medication Sig   • OneTouch Ultra test strip USE TO TEST THREE TO FOUR TIMES DAILY AS DIRECTED   • Pharmacist Choice Lancets MISC Use to test three to four times daily   • [DISCONTINUED] atorvastatin (LIPITOR) 10 mg tablet TAKE 1 TABLET (10 MG TOTAL) BY MOUTH DAILY   • [DISCONTINUED] gabapentin (NEURONTIN) 300 mg capsule TAKE 1 CAPSULE (300 MG TOTAL) BY MOUTH DAILY   • [DISCONTINUED] losartan (COZAAR) 50 mg tablet TAKE 1 TABLET (50 MG TOTAL) BY MOUTH DAILY   • [DISCONTINUED] meloxicam (MOBIC) 15 mg tablet TAKE 1 TABLET (15 MG TOTAL) BY MOUTH DAILY   • [DISCONTINUED] metFORMIN (GLUCOPHAGE) 500 mg tablet TAKE 1 TABLET (500 MG TOTAL) BY MOUTH 2 (TWO) TIMES A DAY WITH MEALS       Objective     /68 (BP Location: Left arm, Patient Position: Sitting)   Pulse 78   Temp 97.9 °F (36.6 °C) (Temporal)   Ht 6' 2" (1.88 m)   Wt 100 kg (220 lb 9.6 oz)   SpO2 98%   BMI 28.32 kg/m²     Physical Exam  Vitals and nursing note reviewed. Constitutional:       General: He is not in acute distress. Appearance: He is well-developed. HENT:      Head: Normocephalic and atraumatic. Right Ear: Tympanic membrane normal.      Left Ear: Tympanic membrane normal.      Nose: Nose normal.      Mouth/Throat:      Mouth: Mucous membranes are moist.   Eyes:      Pupils: Pupils are equal, round, and reactive to light. Neck:      Thyroid: No thyromegaly. Cardiovascular:      Rate and Rhythm: Normal rate and regular rhythm. Pulses: no weak pulses          Dorsalis pedis pulses are 2+ on the right side and 2+ on the left side. Posterior tibial pulses are 2+ on the right side and 2+ on the left side. Heart sounds: Normal heart sounds. No murmur heard. Pulmonary:      Effort: Pulmonary effort is normal. No respiratory distress. Breath sounds: Normal breath sounds. No wheezing. Abdominal:      General: Bowel sounds are normal.      Palpations: Abdomen is soft. Musculoskeletal:      Right shoulder: Tenderness and crepitus present. Decreased range of motion. Decreased strength. Cervical back: Normal range of motion. Feet:      Right foot:      Skin integrity: No ulcer, skin breakdown, erythema, warmth, callus or dry skin. Left foot:      Skin integrity: No ulcer, skin breakdown, erythema, warmth, callus or dry skin. Skin:     General: Skin is warm and dry. Neurological:      General: No focal deficit present. Mental Status: He is alert and oriented to person, place, and time. Psychiatric:         Mood and Affect: Mood normal.      Patient's shoes and socks removed. Right Foot/Ankle   Right Foot Inspection  Skin Exam: skin normal and skin intact. No dry skin, no warmth, no callus, no erythema, no maceration, no abnormal color, no pre-ulcer, no ulcer and no callus. Toe Exam: ROM and strength within normal limits. Sensory   Vibration: intact  Proprioception: intact  Monofilament testing: intact    Vascular  Capillary refills: < 3 seconds  The right DP pulse is 2+. The right PT pulse is 2+. Left Foot/Ankle  Left Foot Inspection  Skin Exam: skin normal and skin intact. No dry skin, no warmth, no erythema, no maceration, normal color, no pre-ulcer, no ulcer and no callus. Toe Exam: ROM and strength within normal limits. Sensory   Vibration: intact  Proprioception: intact  Monofilament testing: intact    Vascular  Capillary refills: < 3 seconds  The left DP pulse is 2+. The left PT pulse is 2+. Assign Risk Category  No deformity present  No loss of protective sensation  No weak pulses  Risk: 0    PHQ-2/9 Depression Screening    Little interest or pleasure in doing things: 1 - several days  Feeling down, depressed, or hopeless: 1 - several days  Trouble falling or staying asleep, or sleeping too much: 1 - several days  Feeling tired or having little energy: 0 - not at all  Poor appetite or overeatin - not at all  Feeling bad about yourself - or that you are a failure or have let yourself or your family down: 0 - not at all  Trouble concentrating on things, such as reading the newspaper or watching television: 0 - not at all  Moving or speaking so slowly that other people could have noticed.  Or the opposite - being so fidgety or restless that you have been moving around a lot more than usual: 0 - not at all  Thoughts that you would be better off dead, or of hurting yourself in some way: 0 - not at all  PHQ-9 Score: 3   PHQ-9 Interpretation: No or Minimal depression        CLARISSA Blancas

## 2023-10-06 ENCOUNTER — SOCIAL WORK (OUTPATIENT)
Dept: BEHAVIORAL/MENTAL HEALTH CLINIC | Facility: CLINIC | Age: 58
End: 2023-10-06
Payer: COMMERCIAL

## 2023-10-06 DIAGNOSIS — F43.21 GRIEF: Primary | ICD-10-CM

## 2023-10-06 DIAGNOSIS — F43.21 SITUATIONAL DEPRESSION: ICD-10-CM

## 2023-10-06 PROCEDURE — 90834 PSYTX W PT 45 MINUTES: CPT | Performed by: SOCIAL WORKER

## 2023-10-06 NOTE — BH CRISIS PLAN
Client Name: Gloria Tim       Client YOB: 1965  : 1965    Treatment Team (include name and contact information):     Psychotherapist: Jennifer Corley [facility]    Psychiatrist: Keysha   Release of information completed: no    " None   Release of information completed: no    Other (Specify Role): None    Release of information completed: no    Other (Specify Role): None   Release of information completed: no    Healthcare Provider  Re Abad, [address] [facility] [address] [place] [address]  [phone]    Type of Plan   * Child plans (children 15 yo and younger) must be completed and signed by the child's legal guardian   * Plans for all individuals 15 yo and above must be signed by the client. Plan Type: adolescent/adult (15 and over) Initial      My Personal Strengths are (in the client's own words):  "I am working enough to keep myself busy, I am a good dad and I was a good ."  The stressors and triggers that may put me at risk are:  death of of my wife, work stressors and house needs a lot of work    Coping skills I can use to keep myself calm and safe:  Physical activity    Coping skills/supports I can use to maintain abstinence from substance use:   Not Applicable    The people that provide me with help and support: (Include name, contact, and how they can help)   Support person #1: Chasidy (friend)    * Phone number: in cell phone    * How can they help me? She listens and tells me things   Support person #2: Daryle Peat (son)    * Phone number: in cell phone    * How can they help me? Him and his wife do everything to arrange what we do monthly     Support person #3: Bill Gibbons    * Phone number: in cell phone    * How can they help me?  He is there for me    In the past, the following has helped me in times of crisis:    Watching television or a movie and Other: Mostly I work, I am always moving      If it is an emergency and you need immediate help, call     If there is a possibility of danger to yourself or others, call the following crisis hotline resources:     Adult Crisis Numbers  Suicide Prevention Hotline - Dial 9-8-8  [facility]: [address]: [address]: [address]: [phone]  [address]: [phone]  Shevlin Librado: [address]: [address]: [phone] (daytime). [phone] (after hours, weekends, holidays)     Child/Adolescent Crisis Numbers   [facility] in the [place]: [address]: [phone]   Rosi Hienr: [phone]   [address] [place] [address] Street: [phone]    Please note: Some [facility] do not have a separate number for Child/Adolescent specific crisis. If your county is not listed under Child/Adolescent, please call the adult number for your county     National Talk to Text Line   All Ages - [phone]    In the event your feelings become unmanageable, and you cannot reach your support system, you will call 911 immediately or go to the nearest hospital emergency room.

## 2023-10-06 NOTE — PSYCH
Behavioral Health Psychotherapy Progress Note    Psychotherapy Provided: Individual Psychotherapy     1. Grief        2. Situational depression            Goals addressed in session: Goal 1     DATA: Bertha Mann shared that he is doing alright and things hit him last month as it was his anniversary which brought him down for about a week. He states that the 22nd of the month is hard for him. He has not been getting out socially and sees his family once a month. He talks to his wife at night when he goes to bed. He is dressed appropriately coming from work and being bearded and appropriately groomed. His thought process is logical and speech is normal rate, volume and content. He understands cognitively it was not his fault. We processed suicide at length. He was very receptive and cried, he is comfortable crying in front of this writer which is indicative of significant progress. His wife was in emotional and physical pain. He is in a lot of pain, from this death. He did not want the gun she used for the suicide back from the police. During this session, this clinician used the following therapeutic modalities: Cognitive Behavioral Therapy    Substance Abuse was not addressed during this session. If the client is diagnosed with a co-occurring substance use disorder, please indicate any changes in the frequency or amount of use: N/A. Stage of change for addressing substance use diagnoses: No substance use/Not applicable    ASSESSMENT:  Bautista Bourgeois presents with a Euthymic/ normal mood his affect is Normal range and intensity, which is congruent, with his mood and the content of the session. The client has made progress on their goals. Bautista Bourgeois presents with a none risk of suicide, none risk of self-harm, and none risk of harm to others. For any risk assessment that surpasses a "low" rating, a safety plan must be developed.     A safety plan was indicated: no  If yes, describe in detail N/A    PLAN: Between sessions, Rubén Matias will try to get out socially once a week. At the next session, the therapist will use Bereavement Therapy and Cognitive Behavioral Therapy to address his loss. Behavioral Health Treatment Plan and Discharge Planning: Rubén Matias is aware of and agrees to continue to work on their treatment plan. They have identified and are working toward their discharge goals.  yes    Visit start and stop times:    10/06/23  Start Time: 7898  Stop Time: 5799  Total Visit Time: 49 minutes

## 2023-10-06 NOTE — BH TREATMENT PLAN
[address] [facility]  1965     Date of Initial Psychotherapy Assessment: 06/23/23  Date of Current Treatment Plan: 10/06/23  Treatment Plan Target Date: 04/05/24  Treatment Plan Expiration Date: 04/05/24    Diagnosis:   1. Grief        2. Situational depression            Area(s) of Need: Bereavement     Long Term Goal 1 (in the client's own words): Client is struggling with the suicide of his wife. Stage of Change: Preparation    Target Date for completion: 04/05/24     Anticipated therapeutic modalities: Grief therapy, reactive depression      People identified to complete this goal: Favio Susana and therapist      Objective 1: (identify the means of measuring success in meeting the objective): Favio Meza will try and get out socially at least one time weekly to alleviate being caught in his own head. Objective 2: (identify the means of measuring success in meeting the objective): Favio Meza will allow himself to feel his feelings for at least 1/2 hour daily, so he is not caught in his head all night. I am currently under the care of a [facility] psychiatric provider: no    My [facility] psychiatric provider is: N/A    I am currently taking psychiatric medications: No    I feel that I will be ready for discharge from mental health care when I reach the following (measurable goal/objective): "When I can accept help from people"    For children and adults who have a legal guardian:   Has there been any change to custody orders and/or guardianship status? NA. If yes, attach updated documentation. I have created my Crisis Plan and have been offered a copy of this plan    [facility]: Diagnosis and Treatment Plan explained to Jame Sera acknowledges an understanding of their diagnosis. Gloria Tim agrees to this treatment plan. I have been offered a copy of this Treatment Plan.  Yes    Due to lack of signature pad verbal consent was obtained.

## 2023-11-09 ENCOUNTER — SOCIAL WORK (OUTPATIENT)
Dept: BEHAVIORAL/MENTAL HEALTH CLINIC | Facility: CLINIC | Age: 58
End: 2023-11-09
Payer: COMMERCIAL

## 2023-11-09 DIAGNOSIS — F43.21 SITUATIONAL DEPRESSION: Primary | ICD-10-CM

## 2023-11-09 DIAGNOSIS — F43.21 GRIEF: ICD-10-CM

## 2023-11-09 PROCEDURE — 90834 PSYTX W PT 45 MINUTES: CPT | Performed by: SOCIAL WORKER

## 2023-11-09 NOTE — PSYCH
Behavioral Health Psychotherapy Progress Note    Psychotherapy Provided: Individual Psychotherapy     1. Situational depression        2. Grief            Goals addressed in session: Goal 1     DATA: Kenith Fleischer states that he will spend Thanksgiving with his kids. He recognizes that this should have happened while his wife was still alive but everyone was mad at each other and she was not talking to each other and he stayed with his wife, even though he disagreed with some of her decisions. He is working more. He signed up for direct payment of all of his bills. He does have a lady who comes and lets his dog out. He states that his wife's name was not on the deed to their house and she was upset about this. He is looking at the signs now that led to her untimely death. His wife viewed herself as bringing him down. She used to help him on the jobs and then could not help him anymore and she felt worthless. 7 or 8 years went by where they did not see the grandchildren due to them not getting along. He did go against her when she was diagnosed with diabetes and saw the kids and they were teenagers by them. He was able to display positive emotions at this session which is in contrast to previous sessions which is indicative of progress. We processed some positive stories about his wife. His boss took him to a concert and they are doing to another one this month. He has not asked his kids to help him clean up his house, and he feels it is his fault the house got that way and he is trying to clean it. During this session, this clinician used the following therapeutic modalities: Bereavement Therapy, Cognitive Behavioral Therapy, and Mindfulness-based Strategies    Substance Abuse was not addressed during this session. If the client is diagnosed with a co-occurring substance use disorder, please indicate any changes in the frequency or amount of use: N/A.  Stage of change for addressing substance use diagnoses: No substance use/Not applicable    ASSESSMENT:  Awais Gallo presents with a Euthymic/ normal mood. his affect is Normal range and intensity, which is congruent, with his mood and the content of the session. The client has made progress on their goals. Awais Gallo presents with a none risk of suicide, none risk of self-harm, and none risk of harm to others. For any risk assessment that surpasses a "low" rating, a safety plan must be developed. A safety plan was indicated: no  If yes, describe in detail N/A    PLAN: Between sessions, Awais Gallo will continue to keep himself busy spending time with his adult children. At the next session, the therapist will use Bereavement Therapy, Cognitive Behavioral Therapy, and Mindfulness-based Strategies to address his grief. Behavioral Health Treatment Plan and Discharge Planning: Awais Gallo is aware of and agrees to continue to work on their treatment plan. They have identified and are working toward their discharge goals.  yes    Visit start and stop times:    11/09/23  Start Time: 3885  Stop Time: 1641  Total Visit Time: 44 minutes

## 2024-01-02 ENCOUNTER — DOCUMENTATION (OUTPATIENT)
Age: 59
End: 2024-01-02

## 2024-01-02 DIAGNOSIS — F43.21 SITUATIONAL DEPRESSION: Primary | ICD-10-CM

## 2024-01-02 DIAGNOSIS — F43.21 GRIEF: ICD-10-CM

## 2024-01-02 NOTE — PROGRESS NOTES
Psychotherapy Discharge Summary    Preferred Name: Mike Flannery  YOB: 1965    Admission date to psychotherapy: 06/23/23    Referred by: Gloria ROMO    Presenting Problem: Grief over the loss of his wife    Course of treatment included : individual therapy     Progress/Outcome of Treatment Goals (brief summary of course of treatment) Francisco shared his emotions over his wife's suicide    Treatment Complications (if any): None    Treatment Progress: fair    Current [facility] Provider: None    Discharge Medications include: See chart    Discharge Date: 01/2/24    Discharge Diagnosis: No diagnosis found.    Criteria for Discharge:  Stopped attending tx    Aftercare recommendations include (include specific referral names and phone numbers, if appropriate): N/A    Prognosis: fair

## 2024-03-10 PROBLEM — Z12.11 ENCOUNTER FOR SCREENING COLONOSCOPY: Status: RESOLVED | Noted: 2023-05-15 | Resolved: 2024-03-10

## 2024-03-10 PROBLEM — Z11.59 ENCOUNTER FOR HEPATITIS C SCREENING TEST FOR LOW RISK PATIENT: Status: RESOLVED | Noted: 2023-05-15 | Resolved: 2024-03-10

## 2024-03-11 ENCOUNTER — OFFICE VISIT (OUTPATIENT)
Dept: FAMILY MEDICINE CLINIC | Facility: CLINIC | Age: 59
End: 2024-03-11
Payer: COMMERCIAL

## 2024-03-11 VITALS
BODY MASS INDEX: 30.03 KG/M2 | WEIGHT: 234 LBS | HEART RATE: 68 BPM | HEIGHT: 74 IN | SYSTOLIC BLOOD PRESSURE: 142 MMHG | TEMPERATURE: 96.1 F | OXYGEN SATURATION: 97 % | DIASTOLIC BLOOD PRESSURE: 90 MMHG

## 2024-03-11 DIAGNOSIS — E11.9 TYPE 2 DIABETES MELLITUS WITHOUT COMPLICATION, WITHOUT LONG-TERM CURRENT USE OF INSULIN (HCC): Primary | ICD-10-CM

## 2024-03-11 DIAGNOSIS — I10 ESSENTIAL HYPERTENSION: ICD-10-CM

## 2024-03-11 DIAGNOSIS — G89.29 CHRONIC PAIN OF RIGHT KNEE: ICD-10-CM

## 2024-03-11 DIAGNOSIS — G89.29 CHRONIC PAIN OF BOTH SHOULDERS: ICD-10-CM

## 2024-03-11 DIAGNOSIS — M25.562 CHRONIC PAIN OF BOTH KNEES: ICD-10-CM

## 2024-03-11 DIAGNOSIS — M25.512 CHRONIC PAIN OF BOTH SHOULDERS: ICD-10-CM

## 2024-03-11 DIAGNOSIS — G89.29 CHRONIC PAIN OF BOTH KNEES: ICD-10-CM

## 2024-03-11 DIAGNOSIS — M25.511 CHRONIC PAIN OF BOTH SHOULDERS: ICD-10-CM

## 2024-03-11 DIAGNOSIS — E78.2 HYPERLIPIDEMIA, MIXED: ICD-10-CM

## 2024-03-11 DIAGNOSIS — M25.561 CHRONIC PAIN OF BOTH KNEES: ICD-10-CM

## 2024-03-11 DIAGNOSIS — M25.511 ACUTE PAIN OF BOTH SHOULDERS: ICD-10-CM

## 2024-03-11 DIAGNOSIS — M25.512 ACUTE PAIN OF BOTH SHOULDERS: ICD-10-CM

## 2024-03-11 DIAGNOSIS — E66.09 CLASS 1 OBESITY DUE TO EXCESS CALORIES WITHOUT SERIOUS COMORBIDITY WITH BODY MASS INDEX (BMI) OF 33.0 TO 33.9 IN ADULT: ICD-10-CM

## 2024-03-11 DIAGNOSIS — M25.561 CHRONIC PAIN OF RIGHT KNEE: ICD-10-CM

## 2024-03-11 PROCEDURE — 99214 OFFICE O/P EST MOD 30 MIN: CPT | Performed by: NURSE PRACTITIONER

## 2024-03-11 RX ORDER — ATORVASTATIN CALCIUM 10 MG/1
10 TABLET, FILM COATED ORAL DAILY
Qty: 90 TABLET | Refills: 3 | Status: SHIPPED | OUTPATIENT
Start: 2024-03-11 | End: 2024-06-09

## 2024-03-11 RX ORDER — LOSARTAN POTASSIUM 50 MG/1
50 TABLET ORAL DAILY
Qty: 90 TABLET | Refills: 3 | Status: SHIPPED | OUTPATIENT
Start: 2024-03-11

## 2024-03-11 RX ORDER — MELOXICAM 15 MG/1
15 TABLET ORAL DAILY
Qty: 90 TABLET | Refills: 3 | Status: SHIPPED | OUTPATIENT
Start: 2024-03-11 | End: 2024-06-09

## 2024-03-11 RX ORDER — GABAPENTIN 300 MG/1
300 CAPSULE ORAL 2 TIMES DAILY
Qty: 180 CAPSULE | Refills: 3 | Status: SHIPPED | OUTPATIENT
Start: 2024-03-11 | End: 2024-06-09

## 2024-03-11 NOTE — ASSESSMENT & PLAN NOTE
Lab Results   Component Value Date    HGBA1C 6.0 09/07/2023   Taking his Metformin 500 mg bid and ordered labs

## 2024-03-11 NOTE — PROGRESS NOTES
Name: Francisco Flannery      : 1965      MRN: [id number]  Encounter Provider: CLARISSA Eden  Encounter Date: 3/11/2024   Encounter department: [facility]    Assessment & Plan     1. Type 2 diabetes mellitus without complication, without long-term current use of insulin (HCC)  Assessment & Plan:    Lab Results   Component Value Date    HGBA1C 6.0 2023   Taking his Metformin 500 mg bid and ordered labs    Orders:  -     atorvastatin (LIPITOR) 10 mg tablet; Take 1 tablet (10 mg total) by mouth daily  -     metFORMIN (GLUCOPHAGE) 500 mg tablet; Take 1 tablet (500 mg total) by mouth 2 (two) times a day with meals    2. Chronic pain of both shoulders  Assessment & Plan:  Discussed options with patient, he's not interested in injections currently. Will refer to ortho.      3. Essential hypertension  Assessment & Plan:  BP slightly elevated today is compliant with his Losartan 50 mf     Orders:  -     losartan (COZAAR) 50 mg tablet; Take 1 tablet (50 mg total) by mouth daily    4. Hyperlipidemia, mixed  Assessment & Plan:  Lipid panel ordered and on the Lipitor 10 mg     Orders:  -     atorvastatin (LIPITOR) 10 mg tablet; Take 1 tablet (10 mg total) by mouth daily    5. Body mass index (BMI) 28.0-28.9, adult  Assessment & Plan:  Weight is stable       6. Class 1 obesity due to excess calories without serious comorbidity with body mass index (BMI) of 33.0 to 33.9 in adult  -     atorvastatin (LIPITOR) 10 mg tablet; Take 1 tablet (10 mg total) by mouth daily    7. Chronic pain of right knee  -     gabapentin (NEURONTIN) 300 mg capsule; Take 1 capsule (300 mg total) by mouth 2 (two) times a day  -     Ambulatory Referral to Orthopedic Surgery; Future    8. Chronic pain of both knees  -     meloxicam (MOBIC) 15 mg tablet; Take 1 tablet (15 mg total) by mouth daily  -     Ambulatory Referral to Orthopedic Surgery; Future    9. Acute pain of both shoulders  -     meloxicam (MOBIC)  15 mg tablet; Take 1 tablet (15 mg total) by mouth daily  -     Ambulatory Referral to Orthopedic Surgery; Future        Depression Screening and Follow-up Plan: Patient was screened for depression during today's encounter. They screened negative with a PHQ-9 score of 3.        Subjective      The patient is here today for a check up. He's doing well and has no complaints other than his shoulders have been bothering him more than usual. He has a difficult time lifting his arm up, but is able to. He's an  and raises his arms frequently.    Diabetes  Pertinent negatives for hypoglycemia include no dizziness, headaches or seizures. Pertinent negatives for diabetes include no chest pain, no fatigue and no weakness.     Review of Systems   Constitutional:  Negative for chills, fatigue and fever.   HENT: Negative.     Eyes:  Negative for pain and visual disturbance.   Respiratory:  Negative for cough, chest tightness and shortness of breath.    Cardiovascular:  Negative for chest pain and palpitations.   Gastrointestinal:  Negative for abdominal pain, constipation, diarrhea, nausea and vomiting.   Endocrine: Negative.    Genitourinary:  Negative for dysuria, frequency, hematuria and urgency.   Musculoskeletal:  Positive for arthralgias and myalgias. Negative for back pain, gait problem, joint swelling and neck stiffness.   Skin:  Negative for color change and rash.   Allergic/Immunologic: Negative.    Neurological:  Negative for dizziness, seizures, syncope, weakness, light-headedness and headaches.   Hematological: Negative.    Psychiatric/Behavioral: Negative.     All other systems reviewed and are negative.      Current Outpatient Medications on File Prior to Visit   Medication Sig   • OneTouch Ultra test strip USE TO TEST THREE TO FOUR TIMES DAILY AS DIRECTED   • Pharmacist Choice Lancets MISC Use to test three to four times daily   • [DISCONTINUED] atorvastatin (LIPITOR) 10 mg tablet Take 1 tablet (10 mg  "total) by mouth daily   • [DISCONTINUED] gabapentin (NEURONTIN) 300 mg capsule Take 1 capsule (300 mg total) by mouth 2 (two) times a day   • [DISCONTINUED] losartan (COZAAR) 50 mg tablet Take 1 tablet (50 mg total) by mouth daily   • [DISCONTINUED] meloxicam (MOBIC) 15 mg tablet Take 1 tablet (15 mg total) by mouth daily   • [DISCONTINUED] metFORMIN (GLUCOPHAGE) 500 mg tablet Take 1 tablet (500 mg total) by mouth 2 (two) times a day with meals       Objective     /90   Pulse 68   Temp (!) 96.1 °F (35.6 °C)   Ht 6' 2\" (1.88 m)   Wt 106 kg (234 lb)   SpO2 97%   BMI 30.04 kg/m²     Physical Exam  Vitals and nursing note reviewed.   Constitutional:       General: He is not in acute distress.     Appearance: Normal appearance. He is not ill-appearing.   HENT:      Head: Normocephalic and atraumatic.      Right Ear: Tympanic membrane normal.      Left Ear: Tympanic membrane normal.      Nose: Nose normal.      Mouth/Throat:      Mouth: Mucous membranes are moist.      Pharynx: Oropharynx is clear.   Eyes:      Conjunctiva/sclera: Conjunctivae normal.   Cardiovascular:      Rate and Rhythm: Normal rate and regular rhythm.      Pulses: Normal pulses. no weak pulses.           Dorsalis pedis pulses are 2+ on the right side and 2+ on the left side.        Posterior tibial pulses are 2+ on the right side and 2+ on the left side.      Heart sounds: Normal heart sounds.   Pulmonary:      Effort: Pulmonary effort is normal.      Breath sounds: Normal breath sounds.   Abdominal:      General: Bowel sounds are normal.      Palpations: Abdomen is soft.   Musculoskeletal:      Right shoulder: Tenderness present. No crepitus. Decreased range of motion.      Left shoulder: Tenderness present. No crepitus. Decreased range of motion.      Cervical back: Normal range of motion.   Feet:      Right foot:      Skin integrity: No ulcer, skin breakdown, erythema, warmth, callus or dry skin.      Left foot:      Skin integrity: No " ulcer, skin breakdown, erythema, warmth, callus or dry skin.   Skin:     General: Skin is warm and dry.   Neurological:      General: No focal deficit present.      Mental Status: He is alert and oriented to person, place, and time.   Psychiatric:         Mood and Affect: Mood normal.         Behavior: Behavior normal.         Thought Content: Thought content normal.         Judgment: Judgment normal.     Patient's shoes and socks removed.    Right Foot/Ankle   Right Foot Inspection  Skin Exam: skin normal and skin intact. No dry skin, no warmth, no callus, no erythema, no maceration, no abnormal color, no pre-ulcer, no ulcer and no callus.     Toe Exam: ROM and strength within normal limits.     Sensory   Vibration: intact  Proprioception: intact  Monofilament testing: intact    Vascular  Capillary refills: < 3 seconds  The right DP pulse is 2+. The right PT pulse is 2+.     Left Foot/Ankle  Left Foot Inspection  Skin Exam: skin normal and skin intact. No dry skin, no warmth, no erythema, no maceration, normal color, no pre-ulcer, no ulcer and no callus.     Toe Exam: ROM and strength within normal limits.     Sensory   Vibration: intact  Proprioception: intact  Monofilament testing: intact    Vascular  Capillary refills: < 3 seconds  The left DP pulse is 2+. The left PT pulse is 2+.     Assign Risk Category  No deformity present  No loss of protective sensation  No weak pulses  Risk: 0     PHQ-2/9 Depression Screening    Little interest or pleasure in doing things: 1 - several days  Feeling down, depressed, or hopeless: 1 - several days  Trouble falling or staying asleep, or sleeping too much: 1 - several days  Feeling tired or having little energy: 0 - not at all  Poor appetite or overeatin - not at all  Feeling bad about yourself - or that you are a failure or have let yourself or your family down: 0 - not at all  Trouble concentrating on things, such as reading the newspaper or watching television: 0 - not  at all  Moving or speaking so slowly that other people could have noticed. Or the opposite - being so fidgety or restless that you have been moving around a lot more than usual: 0 - not at all  Thoughts that you would be better off dead, or of hurting yourself in some way: 0 - not at all  PHQ-9 Score: 3  PHQ-9 Interpretation: No or Minimal depression        CLARISSA Eden

## 2024-09-17 ENCOUNTER — OFFICE VISIT (OUTPATIENT)
Dept: FAMILY MEDICINE CLINIC | Facility: CLINIC | Age: 59
End: 2024-09-17
Payer: COMMERCIAL

## 2024-09-17 VITALS
DIASTOLIC BLOOD PRESSURE: 86 MMHG | WEIGHT: 237 LBS | OXYGEN SATURATION: 98 % | HEART RATE: 62 BPM | BODY MASS INDEX: 30.42 KG/M2 | SYSTOLIC BLOOD PRESSURE: 134 MMHG | TEMPERATURE: 97.1 F | HEIGHT: 74 IN

## 2024-09-17 DIAGNOSIS — G89.29 CHRONIC PAIN OF BOTH KNEES: ICD-10-CM

## 2024-09-17 DIAGNOSIS — G89.29 CHRONIC PAIN OF BOTH SHOULDERS: ICD-10-CM

## 2024-09-17 DIAGNOSIS — Z12.11 SCREEN FOR COLON CANCER: ICD-10-CM

## 2024-09-17 DIAGNOSIS — M25.512 CHRONIC PAIN OF BOTH SHOULDERS: ICD-10-CM

## 2024-09-17 DIAGNOSIS — E66.09 CLASS 1 OBESITY DUE TO EXCESS CALORIES WITHOUT SERIOUS COMORBIDITY WITH BODY MASS INDEX (BMI) OF 33.0 TO 33.9 IN ADULT: ICD-10-CM

## 2024-09-17 DIAGNOSIS — M25.562 CHRONIC PAIN OF BOTH KNEES: ICD-10-CM

## 2024-09-17 DIAGNOSIS — E78.2 HYPERLIPIDEMIA, MIXED: ICD-10-CM

## 2024-09-17 DIAGNOSIS — Z00.00 ANNUAL PHYSICAL EXAM: Primary | ICD-10-CM

## 2024-09-17 DIAGNOSIS — M25.561 CHRONIC PAIN OF BOTH KNEES: ICD-10-CM

## 2024-09-17 DIAGNOSIS — M25.512 ACUTE PAIN OF BOTH SHOULDERS: ICD-10-CM

## 2024-09-17 DIAGNOSIS — G89.29 CHRONIC PAIN OF RIGHT KNEE: ICD-10-CM

## 2024-09-17 DIAGNOSIS — M25.511 CHRONIC PAIN OF BOTH SHOULDERS: ICD-10-CM

## 2024-09-17 DIAGNOSIS — Z12.5 SCREENING FOR PROSTATE CANCER: ICD-10-CM

## 2024-09-17 DIAGNOSIS — M25.511 ACUTE PAIN OF BOTH SHOULDERS: ICD-10-CM

## 2024-09-17 DIAGNOSIS — I10 ESSENTIAL HYPERTENSION: ICD-10-CM

## 2024-09-17 DIAGNOSIS — H90.0 CONDUCTIVE HEARING LOSS, BILATERAL: ICD-10-CM

## 2024-09-17 DIAGNOSIS — M25.561 CHRONIC PAIN OF RIGHT KNEE: ICD-10-CM

## 2024-09-17 DIAGNOSIS — E11.9 TYPE 2 DIABETES MELLITUS WITHOUT COMPLICATION, WITHOUT LONG-TERM CURRENT USE OF INSULIN (HCC): ICD-10-CM

## 2024-09-17 LAB — SL AMB POCT HEMOGLOBIN AIC: 6.4 (ref ?–6.5)

## 2024-09-17 PROCEDURE — 99396 PREV VISIT EST AGE 40-64: CPT | Performed by: NURSE PRACTITIONER

## 2024-09-17 PROCEDURE — 83036 HEMOGLOBIN GLYCOSYLATED A1C: CPT | Performed by: NURSE PRACTITIONER

## 2024-09-17 RX ORDER — MELOXICAM 15 MG/1
15 TABLET ORAL DAILY
Qty: 90 TABLET | Refills: 3 | Status: SHIPPED | OUTPATIENT
Start: 2024-09-17 | End: 2024-12-16

## 2024-09-17 RX ORDER — GABAPENTIN 300 MG/1
300 CAPSULE ORAL 2 TIMES DAILY
Qty: 180 CAPSULE | Refills: 3 | Status: SHIPPED | OUTPATIENT
Start: 2024-09-17 | End: 2024-12-16

## 2024-09-17 RX ORDER — LOSARTAN POTASSIUM 50 MG/1
50 TABLET ORAL DAILY
Qty: 90 TABLET | Refills: 3 | Status: SHIPPED | OUTPATIENT
Start: 2024-09-17

## 2024-09-17 NOTE — ASSESSMENT & PLAN NOTE
Lab Results   Component Value Date    HGBA1C 6.4 09/17/2024       Orders:    POCT hemoglobin A1c    Comprehensive metabolic panel; Future    CBC and differential; Future    Albumin / creatinine urine ratio; Future    Lipid Panel with Direct LDL reflex; Future    metFORMIN (GLUCOPHAGE) 500 mg tablet; Take 1 tablet (500 mg total) by mouth 2 (two) times a day with meals

## 2024-09-17 NOTE — ASSESSMENT & PLAN NOTE
Orders:    gabapentin (NEURONTIN) 300 mg capsule; Take 1 capsule (300 mg total) by mouth 2 (two) times a day    Ambulatory Referral to Orthopedic Surgery; Future

## 2024-09-17 NOTE — PROGRESS NOTES
Adult Annual Physical  Name: Francisco Flannery      : 1965      MRN: [id number]  Encounter Provider: CLARISSA Eden  Encounter Date: 2024   Encounter department: [facility]    Assessment & Plan  Screen for colon cancer    Orders:    Ambulatory Referral to Gastroenterology; Future    Type 2 diabetes mellitus without complication, without long-term current use of insulin (HCC)    Lab Results   Component Value Date    HGBA1C 6.4 2024       Orders:    POCT hemoglobin A1c    Comprehensive metabolic panel; Future    CBC and differential; Future    Albumin / creatinine urine ratio; Future    Lipid Panel with Direct LDL reflex; Future    metFORMIN (GLUCOPHAGE) 500 mg tablet; Take 1 tablet (500 mg total) by mouth 2 (two) times a day with meals    Annual physical exam         Screening for prostate cancer    Orders:    PSA, Total Screen; Future    Hyperlipidemia, mixed         Class 1 obesity due to excess calories without serious comorbidity with body mass index (BMI) of 33.0 to 33.9 in adult         Essential hypertension    Orders:    losartan (COZAAR) 50 mg tablet; Take 1 tablet (50 mg total) by mouth daily    Chronic pain of right knee    Orders:    gabapentin (NEURONTIN) 300 mg capsule; Take 1 capsule (300 mg total) by mouth 2 (two) times a day    Ambulatory Referral to Orthopedic Surgery; Future    Chronic pain of both knees    Orders:    meloxicam (MOBIC) 15 mg tablet; Take 1 tablet (15 mg total) by mouth daily    Ambulatory Referral to Orthopedic Surgery; Future    Acute pain of both shoulders    Orders:    meloxicam (MOBIC) 15 mg tablet; Take 1 tablet (15 mg total) by mouth daily    Ambulatory Referral to Orthopedic Surgery; Future    Chronic pain of both shoulders    Orders:    Ambulatory Referral to Orthopedic Surgery; Future    Conductive hearing loss, bilateral    Orders:    Ambulatory Referral to Otolaryngology; Future    Immunizations and preventive care  screenings were discussed with patient today. Appropriate education was printed on patient's after visit summary.    Discussed risks and benefits of prostate cancer screening. We discussed the controversial history of PSA screening for prostate cancer in the [place] as well as the risk of over detection and over treatment of prostate cancer by way of PSA screening.  The patient understands that PSA blood testing is an imperfect way to screen for prostate cancer and that elevated PSA levels in the blood may also be caused by infection, inflammation, prostatic trauma or manipulation, urological procedures, or by benign prostatic enlargement.    The role of the digital rectal examination in prostate cancer screening was also discussed and I discussed with him that there is large interobserver variability in the findings of digital rectal examination.    Counseling:  Injury prevention: discussed safety/seat belts, safety helmets, smoke detectors, carbon dioxide detectors, and smoking near bedding or upholstery.  Exercise: the importance of regular exercise/physical activity was discussed. Recommend exercise 3-5 times per week for at least 30 minutes.          History of Present Illness     Adult Annual Physical:  Patient presents for annual physical. Patien there today for his check up and annual visit. He has no new complaints and has same complaints has not been to orthopedics for his shoulder issues. .     Diet and Physical Activity:  - Diet/Nutrition: poor diet.  - Exercise: no formal exercise.    General Health:  - Sleep: sleeps well and 4-6 hours of sleep on average.  - Hearing: decreased hearing bilateral ears.  - Vision: goes for regular eye exams.  - Dental: no dental visits for > 1 year.     Health:  - History of STDs: no.   - Urinary symptoms: none.     Review of Systems   Constitutional:  Negative for activity change, appetite change, chills, diaphoresis, fatigue, fever and unexpected weight change.    HENT:  Negative for congestion, ear pain, hearing loss, postnasal drip, sinus pressure, sinus pain, sneezing and sore throat.    Eyes:  Negative for pain, redness and visual disturbance.   Respiratory:  Negative for cough and shortness of breath.    Cardiovascular:  Negative for chest pain and leg swelling.   Gastrointestinal:  Negative for abdominal distention, abdominal pain, diarrhea, nausea and vomiting.   Endocrine: Negative.    Genitourinary: Negative.    Musculoskeletal:  Positive for arthralgias and back pain. Negative for gait problem.   Skin: Negative.    Allergic/Immunologic: Negative.    Neurological:  Negative for dizziness and light-headedness.   Hematological: Negative.    Psychiatric/Behavioral:  Negative for behavioral problems and dysphoric mood.      Pertinent Medical History         Medical History Reviewed by provider this encounter:       Past Medical History   Past Medical History:   Diagnosis Date    Abnormal glucose 05/14/2008    Chest pain 04/23/2008    Erectile dysfunction     last assessed 10/02/13     Past Surgical History:   Procedure Laterality Date    NO PAST SURGERIES       Family History   Problem Relation Age of Onset    No Known Problems Mother      Current Outpatient Medications on File Prior to Visit   Medication Sig Dispense Refill    OneTouch Ultra test strip USE TO TEST THREE TO FOUR TIMES DAILY AS DIRECTED      Pharmacist Choice Lancets MISC Use to test three to four times daily 100 each 3    [DISCONTINUED] atorvastatin (LIPITOR) 10 mg tablet Take 1 tablet (10 mg total) by mouth daily 90 tablet 3    [DISCONTINUED] gabapentin (NEURONTIN) 300 mg capsule Take 1 capsule (300 mg total) by mouth 2 (two) times a day 180 capsule 3    [DISCONTINUED] losartan (COZAAR) 50 mg tablet Take 1 tablet (50 mg total) by mouth daily 90 tablet 3    [DISCONTINUED] meloxicam (MOBIC) 15 mg tablet Take 1 tablet (15 mg total) by mouth daily 90 tablet 3    [DISCONTINUED] metFORMIN (GLUCOPHAGE) 500  "mg tablet Take 1 tablet (500 mg total) by mouth 2 (two) times a day with meals 180 tablet 3     No current facility-administered medications on file prior to visit.   No Known Allergies   Current Outpatient Medications on File Prior to Visit   Medication Sig Dispense Refill    OneTouch Ultra test strip USE TO TEST THREE TO FOUR TIMES DAILY AS DIRECTED      Pharmacist Choice Lancets MISC Use to test three to four times daily 100 each 3    [DISCONTINUED] atorvastatin (LIPITOR) 10 mg tablet Take 1 tablet (10 mg total) by mouth daily 90 tablet 3    [DISCONTINUED] gabapentin (NEURONTIN) 300 mg capsule Take 1 capsule (300 mg total) by mouth 2 (two) times a day 180 capsule 3    [DISCONTINUED] losartan (COZAAR) 50 mg tablet Take 1 tablet (50 mg total) by mouth daily 90 tablet 3    [DISCONTINUED] meloxicam (MOBIC) 15 mg tablet Take 1 tablet (15 mg total) by mouth daily 90 tablet 3    [DISCONTINUED] metFORMIN (GLUCOPHAGE) 500 mg tablet Take 1 tablet (500 mg total) by mouth 2 (two) times a day with meals 180 tablet 3     No current facility-administered medications on file prior to visit.      Social History     Tobacco Use    Smoking status: Never    Smokeless tobacco: Former   Vaping Use    Vaping status: Never Used   Substance and Sexual Activity    Alcohol use: No    Drug use: No    Sexual activity: Not on file       Objective     /86 (BP Location: Left arm, Patient Position: Sitting, Cuff Size: Large)   Pulse 62   Temp (!) 97.1 °F (36.2 °C)   Ht 6' 2\" (1.88 m)   Wt 108 kg (237 lb)   SpO2 98%   BMI 30.43 kg/m²     Physical Exam  Constitutional:       General: He is not in acute distress.     Appearance: He is well-developed.   HENT:      Head: Normocephalic and atraumatic.      Right Ear: Tympanic membrane normal.      Left Ear: Tympanic membrane normal.      Nose: Nose normal.      Mouth/Throat:      Mouth: Mucous membranes are moist.   Eyes:      Pupils: Pupils are equal, round, and reactive to light.   Neck: "      Thyroid: No thyromegaly.   Cardiovascular:      Rate and Rhythm: Normal rate and regular rhythm.      Pulses: no weak pulses.           Dorsalis pedis pulses are 2+ on the right side and 2+ on the left side.        Posterior tibial pulses are 2+ on the right side and 2+ on the left side.      Heart sounds: Normal heart sounds. No murmur heard.  Pulmonary:      Effort: Pulmonary effort is normal. No respiratory distress.      Breath sounds: Normal breath sounds. No wheezing.   Abdominal:      General: Bowel sounds are normal.      Palpations: Abdomen is soft.   Musculoskeletal:      Right shoulder: Tenderness present. Decreased range of motion.      Left shoulder: Tenderness present. Decreased range of motion.      Cervical back: Normal range of motion.      Right knee: Decreased range of motion. Tenderness present.      Left knee: Decreased range of motion. Tenderness present.   Feet:      Right foot:      Skin integrity: No ulcer, skin breakdown, erythema, warmth, callus or dry skin.      Left foot:      Skin integrity: No ulcer, skin breakdown, erythema, warmth, callus or dry skin.   Skin:     General: Skin is warm and dry.   Neurological:      General: No focal deficit present.      Mental Status: He is alert and oriented to person, place, and time.   Psychiatric:         Mood and Affect: Mood normal.     Patient's shoes and socks removed.    Right Foot/Ankle   Right Foot Inspection  Skin Exam: skin normal and skin intact. No dry skin, no warmth, no callus, no erythema, no maceration, no abnormal color, no pre-ulcer, no ulcer and no callus.     Toe Exam: ROM and strength within normal limits.     Sensory   Vibration: intact  Proprioception: intact  Monofilament testing: intact    Vascular  Capillary refills: < 3 seconds  The right DP pulse is 2+. The right PT pulse is 2+.     Left Foot/Ankle  Left Foot Inspection  Skin Exam: skin normal and skin intact. No dry skin, no warmth, no erythema, no maceration,  normal color, no pre-ulcer, no ulcer and no callus.     Toe Exam: ROM and strength within normal limits.     Sensory   Vibration: intact  Proprioception: intact  Monofilament testing: intact    Vascular  Capillary refills: < 3 seconds  The left DP pulse is 2+. The left PT pulse is 2+.     Assign Risk Category  No deformity present  No loss of protective sensation  No weak pulses  Risk: 0

## 2024-09-17 NOTE — PATIENT INSTRUCTIONS
"Patient Education     Routine physical for adults   The Basics   Written by the doctors and editors at [facility]   What is a physical? -- A physical is a routine visit, or \"check-up,\" with your doctor. You might also hear it called a \"wellness visit\" or \"preventive visit.\"  During each visit, the doctor will:   Ask about your physical and mental health   Ask about your habits, behaviors, and lifestyle   Do an exam   Give you vaccines if needed   Talk to you about any medicines you take   Give advice about your health   Answer your questions  Getting regular check-ups is an important part of taking care of your health. It can help your doctor find and treat any problems you have. But it's also important for preventing health problems.  A routine physical is different from a \"sick visit.\" A sick visit is when you see a doctor because of a health concern or problem. Since physicals are scheduled ahead of time, you can think about what you want to ask the doctor.  How often should I get a physical? -- It depends on your age and health. In general, for people age 21 years and older:   If you are younger than 50 years, you might be able to get a physical every 3 years.   If you are 50 years or older, your doctor might recommend a physical every year.  If you have an ongoing health condition, like diabetes or high blood pressure, your doctor will probably want to see you more often.  What happens during a physical? -- In general, each visit will include:   Physical exam - The doctor or nurse will check your height, weight, heart rate, and blood pressure. They will also look at your eyes and ears. They will ask about how you are feeling and whether you have any symptoms that bother you.   Medicines - It's a good idea to bring a list of all the medicines you take to each doctor visit. Your doctor will talk to you about your medicines and answer any questions. Tell them if you are having any side effects that bother you. You " "should also tell them if you are having trouble paying for any of your medicines.   Habits and behaviors - This includes:   Your diet   Your exercise habits   Whether you smoke, drink alcohol, or use drugs   Whether you are sexually active   Whether you feel safe at home  Your doctor will talk to you about things you can do to improve your health and lower your risk of health problems. They will also offer help and support. For example, if you want to quit smoking, they can give you advice and might prescribe medicines. If you want to improve your diet or get more physical activity, they can help you with this, too.   Lab tests, if needed - The tests you get will depend on your age and situation. For example, your doctor might want to check your:   Cholesterol   Blood sugar   Iron level   Vaccines - The recommended vaccines will depend on your age, health, and what vaccines you already had. Vaccines are very important because they can prevent certain serious or deadly infections.   Discussion of screening - \"Screening\" means checking for diseases or other health problems before they cause symptoms. Your doctor can recommend screening based on your age, risk, and preferences. This might include tests to check for:   Cancer, such as breast, prostate, cervical, ovarian, colorectal, prostate, lung, or skin cancer   Sexually transmitted infections, such as chlamydia and gonorrhea   Mental health conditions like depression and anxiety  Your doctor will talk to you about the different types of screening tests. They can help you decide which screenings to have. They can also explain what the results might mean.   Answering questions - The physical is a good time to ask the doctor or nurse questions about your health. If needed, they can refer you to other doctors or specialists, too.  Adults older than 65 years often need other care, too. As you get older, your doctor will talk to you about:   How to prevent falling at " home   Hearing or vision tests   Memory testing   How to take your medicines safely   Making sure that you have the help and support you need at home  All topics are updated as new evidence becomes available and our peer review process is complete.  This topic retrieved from [employer] on: May 02, 2024.  Topic 527632 Version 1.0  Release: 32.4.3 - C32.122  © 2024 Up[employer] and/or its affiliates. All rights reserved.  Consumer Information Use and Disclaimer   Disclaimer: This generalized information is a limited summary of diagnosis, treatment, and/or medication information. It is not meant to be comprehensive and should be used as a tool to help the user understand and/or assess potential diagnostic and treatment options. It does NOT include all information about conditions, treatments, medications, side effects, or risks that may apply to a specific patient. It is not intended to be medical advice or a substitute for the medical advice, diagnosis, or treatment of a health care provider based on the health care provider's examination and assessment of a patient's specific and unique circumstances. Patients must speak with a health care provider for complete information about their health, medical questions, and treatment options, including any risks or benefits regarding use of medications. This information does not endorse any treatments or medications as safe, effective, or approved for treating a specific patient. Up[employer], Inc[employer] and its affiliates disclaim any warranty or liability relating to this information or the use thereof.The use of this information is governed by the Terms of Use, available at https://www.[employer].com/en/know/clinical-effectiveness-terms. 2024© UpToDate, Inc. and its affiliates and/or licensors. All rights reserved.  Copyright   © 2024 [employer], [employer]. and/or its affiliates. All rights reserved.

## 2024-10-17 PROBLEM — Z12.5 SCREENING FOR PROSTATE CANCER: Status: RESOLVED | Noted: 2024-09-17 | Resolved: 2024-10-17

## 2024-10-17 PROBLEM — Z12.11 SCREEN FOR COLON CANCER: Status: RESOLVED | Noted: 2024-09-17 | Resolved: 2024-10-17

## 2024-10-29 ENCOUNTER — TELEPHONE (OUTPATIENT)
Dept: GASTROENTEROLOGY | Facility: CLINIC | Age: 59
End: 2024-10-29

## 2024-10-29 ENCOUNTER — PREP FOR PROCEDURE (OUTPATIENT)
Dept: GASTROENTEROLOGY | Facility: CLINIC | Age: 59
End: 2024-10-29

## 2024-10-29 DIAGNOSIS — Z12.11 SCREENING FOR COLON CANCER: Primary | ICD-10-CM

## 2024-10-29 NOTE — TELEPHONE ENCOUNTER
Called patient from a referral but had canceled last year and needed to reschl  Reviewed and My Charted prep Instructions    Scheduled date of colonoscopy (as of today):12/7/24  Physician performing colonoscopy:Leandro  Location of colonoscopy:[facility]  Bowel prep reviewed with patient:Gaurang/Miralax  Instructions reviewed with patient by:Darrion call  Clearances: none

## 2025-03-17 ENCOUNTER — TELEPHONE (OUTPATIENT)
Age: 60
End: 2025-03-17

## 2025-03-17 NOTE — TELEPHONE ENCOUNTER
Patient changed Insurance and will no longer be seen In [facility]. New insurance not accepted.

## 2025-06-09 DIAGNOSIS — I10 ESSENTIAL HYPERTENSION: ICD-10-CM

## 2025-06-09 RX ORDER — LOSARTAN POTASSIUM 50 MG/1
50 TABLET ORAL DAILY
Qty: 30 TABLET | Refills: 0 | Status: SHIPPED | OUTPATIENT
Start: 2025-06-09

## 2025-07-17 DIAGNOSIS — M25.561 CHRONIC PAIN OF RIGHT KNEE: ICD-10-CM

## 2025-07-17 DIAGNOSIS — G89.29 CHRONIC PAIN OF RIGHT KNEE: ICD-10-CM

## 2025-07-18 RX ORDER — GABAPENTIN 300 MG/1
300 CAPSULE ORAL 2 TIMES DAILY
Qty: 60 CAPSULE | Refills: 0 | Status: SHIPPED | OUTPATIENT
Start: 2025-07-18